# Patient Record
Sex: MALE | Race: WHITE | Employment: FULL TIME | URBAN - METROPOLITAN AREA
[De-identification: names, ages, dates, MRNs, and addresses within clinical notes are randomized per-mention and may not be internally consistent; named-entity substitution may affect disease eponyms.]

---

## 2017-01-02 ENCOUNTER — PATIENT MESSAGE (OUTPATIENT)
Dept: FAMILY MEDICINE CLINIC | Age: 42
End: 2017-01-02

## 2017-01-02 DIAGNOSIS — J01.91 ACUTE RECURRENT SINUSITIS, UNSPECIFIED LOCATION: Primary | ICD-10-CM

## 2017-01-02 PROCEDURE — 99444 PR PHYSICIAN ONLINE EVALUATION & MANAGEMENT SERVICE: CPT | Performed by: FAMILY MEDICINE

## 2017-01-02 RX ORDER — AZITHROMYCIN 250 MG/1
TABLET, FILM COATED ORAL
Qty: 1 PACKET | Refills: 0 | Status: SHIPPED | OUTPATIENT
Start: 2017-01-02 | End: 2017-01-12

## 2017-01-17 ENCOUNTER — OFFICE VISIT (OUTPATIENT)
Dept: PSYCHOLOGY | Age: 42
End: 2017-01-17

## 2017-01-17 DIAGNOSIS — F39 MOOD DISORDER (HCC): Primary | ICD-10-CM

## 2017-01-17 DIAGNOSIS — F41.1 GENERALIZED ANXIETY DISORDER: ICD-10-CM

## 2017-01-17 PROCEDURE — 90832 PSYTX W PT 30 MINUTES: CPT | Performed by: PSYCHOLOGIST

## 2017-01-17 ASSESSMENT — PATIENT HEALTH QUESTIONNAIRE - PHQ9
1. LITTLE INTEREST OR PLEASURE IN DOING THINGS: 0
3. TROUBLE FALLING OR STAYING ASLEEP: 1
7. TROUBLE CONCENTRATING ON THINGS, SUCH AS READING THE NEWSPAPER OR WATCHING TELEVISION: 0
SUM OF ALL RESPONSES TO PHQ QUESTIONS 1-9: 4
5. POOR APPETITE OR OVEREATING: 1
8. MOVING OR SPEAKING SO SLOWLY THAT OTHER PEOPLE COULD HAVE NOTICED. OR THE OPPOSITE, BEING SO FIGETY OR RESTLESS THAT YOU HAVE BEEN MOVING AROUND A LOT MORE THAN USUAL: 0
9. THOUGHTS THAT YOU WOULD BE BETTER OFF DEAD, OR OF HURTING YOURSELF: 0
10. IF YOU CHECKED OFF ANY PROBLEMS, HOW DIFFICULT HAVE THESE PROBLEMS MADE IT FOR YOU TO DO YOUR WORK, TAKE CARE OF THINGS AT HOME, OR GET ALONG WITH OTHER PEOPLE: 0
2. FEELING DOWN, DEPRESSED OR HOPELESS: 1
6. FEELING BAD ABOUT YOURSELF - OR THAT YOU ARE A FAILURE OR HAVE LET YOURSELF OR YOUR FAMILY DOWN: 1
SUM OF ALL RESPONSES TO PHQ9 QUESTIONS 1 & 2: 1
4. FEELING TIRED OR HAVING LITTLE ENERGY: 0

## 2017-01-31 ENCOUNTER — OFFICE VISIT (OUTPATIENT)
Dept: PSYCHOLOGY | Age: 42
End: 2017-01-31

## 2017-01-31 DIAGNOSIS — F41.1 GENERALIZED ANXIETY DISORDER: ICD-10-CM

## 2017-01-31 DIAGNOSIS — F39 MOOD DISORDER (HCC): Primary | ICD-10-CM

## 2017-01-31 PROCEDURE — 90832 PSYTX W PT 30 MINUTES: CPT | Performed by: PSYCHOLOGIST

## 2017-01-31 ASSESSMENT — PATIENT HEALTH QUESTIONNAIRE - PHQ9
4. FEELING TIRED OR HAVING LITTLE ENERGY: 1
8. MOVING OR SPEAKING SO SLOWLY THAT OTHER PEOPLE COULD HAVE NOTICED. OR THE OPPOSITE, BEING SO FIGETY OR RESTLESS THAT YOU HAVE BEEN MOVING AROUND A LOT MORE THAN USUAL: 0
7. TROUBLE CONCENTRATING ON THINGS, SUCH AS READING THE NEWSPAPER OR WATCHING TELEVISION: 1
1. LITTLE INTEREST OR PLEASURE IN DOING THINGS: 0
6. FEELING BAD ABOUT YOURSELF - OR THAT YOU ARE A FAILURE OR HAVE LET YOURSELF OR YOUR FAMILY DOWN: 0
9. THOUGHTS THAT YOU WOULD BE BETTER OFF DEAD, OR OF HURTING YOURSELF: 0
SUM OF ALL RESPONSES TO PHQ9 QUESTIONS 1 & 2: 0
SUM OF ALL RESPONSES TO PHQ QUESTIONS 1-9: 3
3. TROUBLE FALLING OR STAYING ASLEEP: 1
10. IF YOU CHECKED OFF ANY PROBLEMS, HOW DIFFICULT HAVE THESE PROBLEMS MADE IT FOR YOU TO DO YOUR WORK, TAKE CARE OF THINGS AT HOME, OR GET ALONG WITH OTHER PEOPLE: 0
2. FEELING DOWN, DEPRESSED OR HOPELESS: 0
5. POOR APPETITE OR OVEREATING: 0

## 2017-02-10 ENCOUNTER — OFFICE VISIT (OUTPATIENT)
Dept: FAMILY MEDICINE CLINIC | Age: 42
End: 2017-02-10

## 2017-02-10 VITALS
HEIGHT: 72 IN | WEIGHT: 198.2 LBS | SYSTOLIC BLOOD PRESSURE: 110 MMHG | BODY MASS INDEX: 26.84 KG/M2 | TEMPERATURE: 98.5 F | HEART RATE: 52 BPM | OXYGEN SATURATION: 98 % | DIASTOLIC BLOOD PRESSURE: 80 MMHG

## 2017-02-10 DIAGNOSIS — F41.9 ANXIETY: ICD-10-CM

## 2017-02-10 DIAGNOSIS — E78.2 MIXED HYPERLIPIDEMIA: ICD-10-CM

## 2017-02-10 DIAGNOSIS — H34.8312 BRANCH RETINAL VEIN OCCLUSION OF RIGHT EYE: ICD-10-CM

## 2017-02-10 DIAGNOSIS — D68.59 HYPERCOAGULABLE STATE (HCC): Primary | ICD-10-CM

## 2017-02-10 PROCEDURE — 99214 OFFICE O/P EST MOD 30 MIN: CPT | Performed by: FAMILY MEDICINE

## 2017-02-10 RX ORDER — ASPIRIN 81 MG/1
81 TABLET, CHEWABLE ORAL
COMMUNITY
Start: 2017-01-20 | End: 2017-02-28

## 2017-02-10 RX ORDER — LAMOTRIGINE 25 MG/1
25 TABLET ORAL 2 TIMES DAILY
COMMUNITY
End: 2017-07-18

## 2017-02-10 RX ORDER — ATORVASTATIN CALCIUM 80 MG/1
80 TABLET, FILM COATED ORAL DAILY
Qty: 30 TABLET | Refills: 0
Start: 2017-02-10 | End: 2017-02-28 | Stop reason: SDUPTHER

## 2017-02-12 ASSESSMENT — ENCOUNTER SYMPTOMS
RESPIRATORY NEGATIVE: 1
GASTROINTESTINAL NEGATIVE: 1
EYES NEGATIVE: 1

## 2017-02-15 DIAGNOSIS — E78.2 MIXED HYPERLIPIDEMIA: ICD-10-CM

## 2017-02-15 LAB
CHOLESTEROL, TOTAL: 189 MG/DL (ref 0–199)
HDLC SERPL-MCNC: 60 MG/DL (ref 40–60)
LDL CHOLESTEROL CALCULATED: 109 MG/DL
TRIGL SERPL-MCNC: 102 MG/DL (ref 0–150)
VLDLC SERPL CALC-MCNC: 20 MG/DL

## 2017-02-21 ENCOUNTER — OFFICE VISIT (OUTPATIENT)
Dept: PSYCHOLOGY | Age: 42
End: 2017-02-21

## 2017-02-21 DIAGNOSIS — F41.1 GENERALIZED ANXIETY DISORDER: ICD-10-CM

## 2017-02-21 DIAGNOSIS — F39 MOOD DISORDER (HCC): Primary | ICD-10-CM

## 2017-02-21 PROCEDURE — 90832 PSYTX W PT 30 MINUTES: CPT | Performed by: PSYCHOLOGIST

## 2017-02-21 ASSESSMENT — PATIENT HEALTH QUESTIONNAIRE - PHQ9
3. TROUBLE FALLING OR STAYING ASLEEP: 0
5. POOR APPETITE OR OVEREATING: 0
8. MOVING OR SPEAKING SO SLOWLY THAT OTHER PEOPLE COULD HAVE NOTICED. OR THE OPPOSITE, BEING SO FIGETY OR RESTLESS THAT YOU HAVE BEEN MOVING AROUND A LOT MORE THAN USUAL: 0
6. FEELING BAD ABOUT YOURSELF - OR THAT YOU ARE A FAILURE OR HAVE LET YOURSELF OR YOUR FAMILY DOWN: 0
1. LITTLE INTEREST OR PLEASURE IN DOING THINGS: 0
2. FEELING DOWN, DEPRESSED OR HOPELESS: 0
4. FEELING TIRED OR HAVING LITTLE ENERGY: 0
SUM OF ALL RESPONSES TO PHQ9 QUESTIONS 1 & 2: 0
9. THOUGHTS THAT YOU WOULD BE BETTER OFF DEAD, OR OF HURTING YOURSELF: 0
SUM OF ALL RESPONSES TO PHQ QUESTIONS 1-9: 0
7. TROUBLE CONCENTRATING ON THINGS, SUCH AS READING THE NEWSPAPER OR WATCHING TELEVISION: 0

## 2017-02-28 DIAGNOSIS — E78.2 MIXED HYPERLIPIDEMIA: ICD-10-CM

## 2017-02-28 RX ORDER — ATORVASTATIN CALCIUM 80 MG/1
80 TABLET, FILM COATED ORAL DAILY
Qty: 90 TABLET | Refills: 1 | Status: SHIPPED | OUTPATIENT
Start: 2017-02-28 | End: 2017-10-31 | Stop reason: SDUPTHER

## 2017-03-28 ENCOUNTER — PATIENT MESSAGE (OUTPATIENT)
Dept: FAMILY MEDICINE CLINIC | Age: 42
End: 2017-03-28

## 2017-03-28 RX ORDER — LORAZEPAM 0.5 MG/1
0.5 TABLET ORAL EVERY 6 HOURS PRN
Qty: 12 TABLET | Refills: 0 | Status: SHIPPED | OUTPATIENT
Start: 2017-03-28 | End: 2017-09-11 | Stop reason: ALTCHOICE

## 2017-05-12 ENCOUNTER — TELEPHONE (OUTPATIENT)
Dept: FAMILY MEDICINE CLINIC | Age: 42
End: 2017-05-12

## 2017-06-06 ENCOUNTER — TELEPHONE (OUTPATIENT)
Dept: PSYCHOLOGY | Age: 42
End: 2017-06-06

## 2017-06-06 ENCOUNTER — OFFICE VISIT (OUTPATIENT)
Dept: PSYCHOLOGY | Age: 42
End: 2017-06-06

## 2017-06-06 DIAGNOSIS — F39 MOOD DISORDER (HCC): Primary | ICD-10-CM

## 2017-06-06 DIAGNOSIS — F41.1 GENERALIZED ANXIETY DISORDER: ICD-10-CM

## 2017-06-06 PROCEDURE — 90832 PSYTX W PT 30 MINUTES: CPT | Performed by: PSYCHOLOGIST

## 2017-06-06 ASSESSMENT — ANXIETY QUESTIONNAIRES
4. TROUBLE RELAXING: 3-NEARLY EVERY DAY
1. FEELING NERVOUS, ANXIOUS, OR ON EDGE: 1-SEVERAL DAYS
5. BEING SO RESTLESS THAT IT IS HARD TO SIT STILL: 2-OVER HALF THE DAYS
GAD7 TOTAL SCORE: 11
7. FEELING AFRAID AS IF SOMETHING AWFUL MIGHT HAPPEN: 0-NOT AT ALL SURE
2. NOT BEING ABLE TO STOP OR CONTROL WORRYING: 1-SEVERAL DAYS
6. BECOMING EASILY ANNOYED OR IRRITABLE: 2-OVER HALF THE DAYS
3. WORRYING TOO MUCH ABOUT DIFFERENT THINGS: 2-OVER HALF THE DAYS

## 2017-06-06 ASSESSMENT — PATIENT HEALTH QUESTIONNAIRE - PHQ9
2. FEELING DOWN, DEPRESSED OR HOPELESS: 2
SUM OF ALL RESPONSES TO PHQ QUESTIONS 1-9: 9
6. FEELING BAD ABOUT YOURSELF - OR THAT YOU ARE A FAILURE OR HAVE LET YOURSELF OR YOUR FAMILY DOWN: 2
5. POOR APPETITE OR OVEREATING: 0
3. TROUBLE FALLING OR STAYING ASLEEP: 2
1. LITTLE INTEREST OR PLEASURE IN DOING THINGS: 1
SUM OF ALL RESPONSES TO PHQ9 QUESTIONS 1 & 2: 3
8. MOVING OR SPEAKING SO SLOWLY THAT OTHER PEOPLE COULD HAVE NOTICED. OR THE OPPOSITE, BEING SO FIGETY OR RESTLESS THAT YOU HAVE BEEN MOVING AROUND A LOT MORE THAN USUAL: 0
4. FEELING TIRED OR HAVING LITTLE ENERGY: 1
7. TROUBLE CONCENTRATING ON THINGS, SUCH AS READING THE NEWSPAPER OR WATCHING TELEVISION: 0
9. THOUGHTS THAT YOU WOULD BE BETTER OFF DEAD, OR OF HURTING YOURSELF: 1

## 2017-06-06 NOTE — TELEPHONE ENCOUNTER
Pt denied benefit from Lamictal at any time and expressed interest in stopping this medication. Reports that he has never received benefit from any psychotropic medications he has tried. Elevated distress lately due to psychosocial stressors. How would you like to proceed?

## 2017-06-20 ENCOUNTER — OFFICE VISIT (OUTPATIENT)
Dept: PSYCHOLOGY | Age: 42
End: 2017-06-20

## 2017-06-20 DIAGNOSIS — F39 MOOD DISORDER (HCC): Primary | ICD-10-CM

## 2017-06-20 DIAGNOSIS — F41.1 GENERALIZED ANXIETY DISORDER: ICD-10-CM

## 2017-06-20 PROCEDURE — 90832 PSYTX W PT 30 MINUTES: CPT | Performed by: PSYCHOLOGIST

## 2017-06-20 ASSESSMENT — PATIENT HEALTH QUESTIONNAIRE - PHQ9
9. THOUGHTS THAT YOU WOULD BE BETTER OFF DEAD, OR OF HURTING YOURSELF: 0
3. TROUBLE FALLING OR STAYING ASLEEP: 0
2. FEELING DOWN, DEPRESSED OR HOPELESS: 1
6. FEELING BAD ABOUT YOURSELF - OR THAT YOU ARE A FAILURE OR HAVE LET YOURSELF OR YOUR FAMILY DOWN: 1
5. POOR APPETITE OR OVEREATING: 0
8. MOVING OR SPEAKING SO SLOWLY THAT OTHER PEOPLE COULD HAVE NOTICED. OR THE OPPOSITE, BEING SO FIGETY OR RESTLESS THAT YOU HAVE BEEN MOVING AROUND A LOT MORE THAN USUAL: 0
4. FEELING TIRED OR HAVING LITTLE ENERGY: 2
SUM OF ALL RESPONSES TO PHQ QUESTIONS 1-9: 6
SUM OF ALL RESPONSES TO PHQ9 QUESTIONS 1 & 2: 2
1. LITTLE INTEREST OR PLEASURE IN DOING THINGS: 1
7. TROUBLE CONCENTRATING ON THINGS, SUCH AS READING THE NEWSPAPER OR WATCHING TELEVISION: 1

## 2017-06-20 ASSESSMENT — ANXIETY QUESTIONNAIRES
7. FEELING AFRAID AS IF SOMETHING AWFUL MIGHT HAPPEN: 1-SEVERAL DAYS
2. NOT BEING ABLE TO STOP OR CONTROL WORRYING: 1-SEVERAL DAYS
3. WORRYING TOO MUCH ABOUT DIFFERENT THINGS: 1-SEVERAL DAYS
5. BEING SO RESTLESS THAT IT IS HARD TO SIT STILL: 0-NOT AT ALL SURE
GAD7 TOTAL SCORE: 8
1. FEELING NERVOUS, ANXIOUS, OR ON EDGE: 2-OVER HALF THE DAYS
6. BECOMING EASILY ANNOYED OR IRRITABLE: 1-SEVERAL DAYS
4. TROUBLE RELAXING: 2-OVER HALF THE DAYS

## 2017-06-21 ENCOUNTER — TELEPHONE (OUTPATIENT)
Dept: FAMILY MEDICINE CLINIC | Age: 42
End: 2017-06-21

## 2017-07-07 ENCOUNTER — OFFICE VISIT (OUTPATIENT)
Dept: PSYCHOLOGY | Age: 42
End: 2017-07-07

## 2017-07-07 DIAGNOSIS — F39 MOOD DISORDER (HCC): Primary | ICD-10-CM

## 2017-07-07 DIAGNOSIS — F41.1 GENERALIZED ANXIETY DISORDER: ICD-10-CM

## 2017-07-07 PROCEDURE — 90832 PSYTX W PT 30 MINUTES: CPT | Performed by: PSYCHOLOGIST

## 2017-07-07 ASSESSMENT — ANXIETY QUESTIONNAIRES
4. TROUBLE RELAXING: 3-NEARLY EVERY DAY
6. BECOMING EASILY ANNOYED OR IRRITABLE: 2-OVER HALF THE DAYS
7. FEELING AFRAID AS IF SOMETHING AWFUL MIGHT HAPPEN: 1-SEVERAL DAYS
1. FEELING NERVOUS, ANXIOUS, OR ON EDGE: 2-OVER HALF THE DAYS
2. NOT BEING ABLE TO STOP OR CONTROL WORRYING: 3-NEARLY EVERY DAY
5. BEING SO RESTLESS THAT IT IS HARD TO SIT STILL: 1-SEVERAL DAYS
GAD7 TOTAL SCORE: 15
3. WORRYING TOO MUCH ABOUT DIFFERENT THINGS: 3-NEARLY EVERY DAY

## 2017-07-07 ASSESSMENT — PATIENT HEALTH QUESTIONNAIRE - PHQ9
3. TROUBLE FALLING OR STAYING ASLEEP: 1
4. FEELING TIRED OR HAVING LITTLE ENERGY: 1
SUM OF ALL RESPONSES TO PHQ9 QUESTIONS 1 & 2: 3
9. THOUGHTS THAT YOU WOULD BE BETTER OFF DEAD, OR OF HURTING YOURSELF: 1
6. FEELING BAD ABOUT YOURSELF - OR THAT YOU ARE A FAILURE OR HAVE LET YOURSELF OR YOUR FAMILY DOWN: 3
2. FEELING DOWN, DEPRESSED OR HOPELESS: 1
7. TROUBLE CONCENTRATING ON THINGS, SUCH AS READING THE NEWSPAPER OR WATCHING TELEVISION: 1
1. LITTLE INTEREST OR PLEASURE IN DOING THINGS: 2
SUM OF ALL RESPONSES TO PHQ QUESTIONS 1-9: 11
5. POOR APPETITE OR OVEREATING: 1
8. MOVING OR SPEAKING SO SLOWLY THAT OTHER PEOPLE COULD HAVE NOTICED. OR THE OPPOSITE, BEING SO FIGETY OR RESTLESS THAT YOU HAVE BEEN MOVING AROUND A LOT MORE THAN USUAL: 0

## 2017-07-13 ENCOUNTER — PATIENT MESSAGE (OUTPATIENT)
Dept: FAMILY MEDICINE CLINIC | Age: 42
End: 2017-07-13

## 2017-07-18 ENCOUNTER — OFFICE VISIT (OUTPATIENT)
Dept: PSYCHIATRY | Age: 42
End: 2017-07-18

## 2017-07-18 VITALS
BODY MASS INDEX: 27.22 KG/M2 | WEIGHT: 201 LBS | SYSTOLIC BLOOD PRESSURE: 130 MMHG | HEIGHT: 72 IN | HEART RATE: 75 BPM | DIASTOLIC BLOOD PRESSURE: 90 MMHG | OXYGEN SATURATION: 98 %

## 2017-07-18 DIAGNOSIS — F41.1 GAD (GENERALIZED ANXIETY DISORDER): ICD-10-CM

## 2017-07-18 DIAGNOSIS — F31.60 BIPOLAR AFFECTIVE DISORDER, MIXED (HCC): Primary | ICD-10-CM

## 2017-07-18 PROCEDURE — 99215 OFFICE O/P EST HI 40 MIN: CPT | Performed by: PSYCHIATRY & NEUROLOGY

## 2017-07-18 RX ORDER — GABAPENTIN 300 MG/1
300 CAPSULE ORAL NIGHTLY
Qty: 90 CAPSULE | Refills: 2 | Status: SHIPPED | OUTPATIENT
Start: 2017-07-18 | End: 2017-09-11 | Stop reason: SDUPTHER

## 2017-07-18 RX ORDER — LAMOTRIGINE 25 MG/1
25 TABLET ORAL DAILY
Qty: 60 TABLET | Refills: 2 | Status: SHIPPED | OUTPATIENT
Start: 2017-07-18 | End: 2017-09-11

## 2017-07-18 RX ORDER — GABAPENTIN 100 MG/1
100 CAPSULE ORAL 3 TIMES DAILY
Qty: 90 CAPSULE | Refills: 2 | Status: SHIPPED | OUTPATIENT
Start: 2017-07-18 | End: 2017-09-11

## 2017-07-18 ASSESSMENT — ENCOUNTER SYMPTOMS
RESPIRATORY NEGATIVE: 1
GASTROINTESTINAL NEGATIVE: 1
BACK PAIN: 1
EYES NEGATIVE: 1

## 2017-08-01 ENCOUNTER — TELEPHONE (OUTPATIENT)
Dept: FAMILY MEDICINE CLINIC | Age: 42
End: 2017-08-01

## 2017-08-01 ENCOUNTER — OFFICE VISIT (OUTPATIENT)
Dept: FAMILY MEDICINE CLINIC | Age: 42
End: 2017-08-01

## 2017-08-01 ENCOUNTER — OFFICE VISIT (OUTPATIENT)
Dept: PSYCHOLOGY | Age: 42
End: 2017-08-01

## 2017-08-01 VITALS
HEART RATE: 100 BPM | WEIGHT: 202.1 LBS | DIASTOLIC BLOOD PRESSURE: 86 MMHG | SYSTOLIC BLOOD PRESSURE: 118 MMHG | TEMPERATURE: 98.1 F | OXYGEN SATURATION: 100 % | BODY MASS INDEX: 27.37 KG/M2 | HEIGHT: 72 IN

## 2017-08-01 DIAGNOSIS — F31.60 BIPOLAR AFFECTIVE DISORDER, MIXED (HCC): ICD-10-CM

## 2017-08-01 DIAGNOSIS — M47.26 OSTEOARTHRITIS OF SPINE WITH RADICULOPATHY, LUMBAR REGION: ICD-10-CM

## 2017-08-01 DIAGNOSIS — F32.0 MILD SINGLE CURRENT EPISODE OF MAJOR DEPRESSIVE DISORDER (HCC): ICD-10-CM

## 2017-08-01 DIAGNOSIS — F41.9 ANXIETY: ICD-10-CM

## 2017-08-01 DIAGNOSIS — M51.26 LUMBAR DISC HERNIATION: ICD-10-CM

## 2017-08-01 DIAGNOSIS — F41.1 GENERALIZED ANXIETY DISORDER: ICD-10-CM

## 2017-08-01 DIAGNOSIS — Z01.818 PREOP EXAMINATION: Primary | ICD-10-CM

## 2017-08-01 DIAGNOSIS — E78.2 MIXED HYPERLIPIDEMIA: ICD-10-CM

## 2017-08-01 PROCEDURE — 99244 OFF/OP CNSLTJ NEW/EST MOD 40: CPT | Performed by: NURSE PRACTITIONER

## 2017-08-01 PROCEDURE — 90832 PSYTX W PT 30 MINUTES: CPT | Performed by: PSYCHOLOGIST

## 2017-08-01 ASSESSMENT — PATIENT HEALTH QUESTIONNAIRE - PHQ9
3. TROUBLE FALLING OR STAYING ASLEEP: 0
4. FEELING TIRED OR HAVING LITTLE ENERGY: 1
5. POOR APPETITE OR OVEREATING: 1
6. FEELING BAD ABOUT YOURSELF - OR THAT YOU ARE A FAILURE OR HAVE LET YOURSELF OR YOUR FAMILY DOWN: 1
1. LITTLE INTEREST OR PLEASURE IN DOING THINGS: 1
8. MOVING OR SPEAKING SO SLOWLY THAT OTHER PEOPLE COULD HAVE NOTICED. OR THE OPPOSITE, BEING SO FIGETY OR RESTLESS THAT YOU HAVE BEEN MOVING AROUND A LOT MORE THAN USUAL: 1
SUM OF ALL RESPONSES TO PHQ QUESTIONS 1-9: 7
2. FEELING DOWN, DEPRESSED OR HOPELESS: 1
9. THOUGHTS THAT YOU WOULD BE BETTER OFF DEAD, OR OF HURTING YOURSELF: 0
SUM OF ALL RESPONSES TO PHQ9 QUESTIONS 1 & 2: 2
7. TROUBLE CONCENTRATING ON THINGS, SUCH AS READING THE NEWSPAPER OR WATCHING TELEVISION: 1

## 2017-08-01 ASSESSMENT — ANXIETY QUESTIONNAIRES
5. BEING SO RESTLESS THAT IT IS HARD TO SIT STILL: 2-OVER HALF THE DAYS
1. FEELING NERVOUS, ANXIOUS, OR ON EDGE: 2-OVER HALF THE DAYS
GAD7 TOTAL SCORE: 13
4. TROUBLE RELAXING: 3-NEARLY EVERY DAY
2. NOT BEING ABLE TO STOP OR CONTROL WORRYING: 1-SEVERAL DAYS
6. BECOMING EASILY ANNOYED OR IRRITABLE: 2-OVER HALF THE DAYS
3. WORRYING TOO MUCH ABOUT DIFFERENT THINGS: 2-OVER HALF THE DAYS
7. FEELING AFRAID AS IF SOMETHING AWFUL MIGHT HAPPEN: 1-SEVERAL DAYS

## 2017-08-15 ENCOUNTER — TELEPHONE (OUTPATIENT)
Dept: FAMILY MEDICINE CLINIC | Age: 42
End: 2017-08-15

## 2017-09-11 ENCOUNTER — OFFICE VISIT (OUTPATIENT)
Dept: PSYCHIATRY | Age: 42
End: 2017-09-11

## 2017-09-11 VITALS
HEART RATE: 86 BPM | BODY MASS INDEX: 27.83 KG/M2 | HEIGHT: 73 IN | OXYGEN SATURATION: 98 % | DIASTOLIC BLOOD PRESSURE: 70 MMHG | WEIGHT: 210 LBS | SYSTOLIC BLOOD PRESSURE: 110 MMHG

## 2017-09-11 DIAGNOSIS — F31.60 BIPOLAR AFFECTIVE DISORDER, MIXED (HCC): ICD-10-CM

## 2017-09-11 DIAGNOSIS — F41.1 GAD (GENERALIZED ANXIETY DISORDER): Primary | ICD-10-CM

## 2017-09-11 PROCEDURE — 99214 OFFICE O/P EST MOD 30 MIN: CPT | Performed by: PSYCHIATRY & NEUROLOGY

## 2017-09-11 RX ORDER — LAMOTRIGINE 100 MG/1
100 TABLET ORAL NIGHTLY
Qty: 60 TABLET | Refills: 3 | Status: SHIPPED | OUTPATIENT
Start: 2017-09-11 | End: 2017-10-04 | Stop reason: SDUPTHER

## 2017-09-11 RX ORDER — GABAPENTIN 300 MG/1
300 CAPSULE ORAL 3 TIMES DAILY
Qty: 90 CAPSULE | Refills: 2 | Status: SHIPPED | OUTPATIENT
Start: 2017-09-11 | End: 2017-11-13 | Stop reason: SDUPTHER

## 2017-09-11 ASSESSMENT — ENCOUNTER SYMPTOMS
GASTROINTESTINAL NEGATIVE: 1
EYES NEGATIVE: 1
RESPIRATORY NEGATIVE: 1
BACK PAIN: 1

## 2017-09-29 DIAGNOSIS — E78.2 MIXED HYPERLIPIDEMIA: ICD-10-CM

## 2017-09-29 RX ORDER — FENOFIBRATE 145 MG/1
TABLET, COATED ORAL
Qty: 21 TABLET | Refills: 0 | Status: SHIPPED | OUTPATIENT
Start: 2017-09-29 | End: 2017-10-31 | Stop reason: SDUPTHER

## 2017-10-03 ENCOUNTER — TELEPHONE (OUTPATIENT)
Dept: PSYCHIATRY | Age: 42
End: 2017-10-03

## 2017-10-03 NOTE — TELEPHONE ENCOUNTER
Unsure if increased to 150mg already. If recently increased should continue that dose. (He was to take 100mg for 2 weeks then increase to 150mg) Could we find out. The medication cannot be increased quickly due to concern of rash.

## 2017-10-04 ENCOUNTER — OFFICE VISIT (OUTPATIENT)
Dept: FAMILY MEDICINE CLINIC | Age: 42
End: 2017-10-04

## 2017-10-04 VITALS
SYSTOLIC BLOOD PRESSURE: 120 MMHG | BODY MASS INDEX: 28.1 KG/M2 | WEIGHT: 213 LBS | DIASTOLIC BLOOD PRESSURE: 86 MMHG | TEMPERATURE: 98.2 F

## 2017-10-04 DIAGNOSIS — E55.9 VITAMIN D DEFICIENCY: ICD-10-CM

## 2017-10-04 DIAGNOSIS — F31.60 BIPOLAR AFFECTIVE DISORDER, MIXED (HCC): Primary | ICD-10-CM

## 2017-10-04 DIAGNOSIS — G89.4 CHRONIC PAIN SYNDROME: ICD-10-CM

## 2017-10-04 DIAGNOSIS — F31.60 BIPOLAR AFFECTIVE DISORDER, MIXED (HCC): ICD-10-CM

## 2017-10-04 DIAGNOSIS — F32.1 MODERATE SINGLE CURRENT EPISODE OF MAJOR DEPRESSIVE DISORDER (HCC): ICD-10-CM

## 2017-10-04 DIAGNOSIS — F41.1 GENERALIZED ANXIETY DISORDER: ICD-10-CM

## 2017-10-04 LAB
A/G RATIO: 1.8 (ref 1.1–2.2)
ALBUMIN SERPL-MCNC: 4.8 G/DL (ref 3.4–5)
ALP BLD-CCNC: 84 U/L (ref 40–129)
ALT SERPL-CCNC: 55 U/L (ref 10–40)
ANION GAP SERPL CALCULATED.3IONS-SCNC: 17 MMOL/L (ref 3–16)
AST SERPL-CCNC: 103 U/L (ref 15–37)
BASOPHILS ABSOLUTE: 0.1 K/UL (ref 0–0.2)
BASOPHILS RELATIVE PERCENT: 1 %
BILIRUB SERPL-MCNC: <0.2 MG/DL (ref 0–1)
BUN BLDV-MCNC: 13 MG/DL (ref 7–20)
CALCIUM SERPL-MCNC: 10.2 MG/DL (ref 8.3–10.6)
CHLORIDE BLD-SCNC: 100 MMOL/L (ref 99–110)
CO2: 24 MMOL/L (ref 21–32)
CREAT SERPL-MCNC: 0.7 MG/DL (ref 0.9–1.3)
EOSINOPHILS ABSOLUTE: 0.3 K/UL (ref 0–0.6)
EOSINOPHILS RELATIVE PERCENT: 3.1 %
GFR AFRICAN AMERICAN: >60
GFR NON-AFRICAN AMERICAN: >60
GLOBULIN: 2.6 G/DL
GLUCOSE BLD-MCNC: 94 MG/DL (ref 70–99)
HCT VFR BLD CALC: 41.1 % (ref 40.5–52.5)
HEMOGLOBIN: 14.1 G/DL (ref 13.5–17.5)
LYMPHOCYTES ABSOLUTE: 2.5 K/UL (ref 1–5.1)
LYMPHOCYTES RELATIVE PERCENT: 30 %
MCH RBC QN AUTO: 31.9 PG (ref 26–34)
MCHC RBC AUTO-ENTMCNC: 34.4 G/DL (ref 31–36)
MCV RBC AUTO: 92.7 FL (ref 80–100)
MONOCYTES ABSOLUTE: 0.7 K/UL (ref 0–1.3)
MONOCYTES RELATIVE PERCENT: 8.2 %
NEUTROPHILS ABSOLUTE: 4.7 K/UL (ref 1.7–7.7)
NEUTROPHILS RELATIVE PERCENT: 57.7 %
PDW BLD-RTO: 13.3 % (ref 12.4–15.4)
PLATELET # BLD: 355 K/UL (ref 135–450)
PMV BLD AUTO: 9 FL (ref 5–10.5)
POTASSIUM SERPL-SCNC: 4.6 MMOL/L (ref 3.5–5.1)
RBC # BLD: 4.43 M/UL (ref 4.2–5.9)
SODIUM BLD-SCNC: 141 MMOL/L (ref 136–145)
TOTAL PROTEIN: 7.4 G/DL (ref 6.4–8.2)
TSH SERPL DL<=0.05 MIU/L-ACNC: 1.55 UIU/ML (ref 0.27–4.2)
VITAMIN D 25-HYDROXY: 29.3 NG/ML
WBC # BLD: 8.2 K/UL (ref 4–11)

## 2017-10-04 PROCEDURE — 99214 OFFICE O/P EST MOD 30 MIN: CPT | Performed by: FAMILY MEDICINE

## 2017-10-04 RX ORDER — CLONIDINE HYDROCHLORIDE 0.1 MG/1
0.1 TABLET ORAL 2 TIMES DAILY
Qty: 60 TABLET | Refills: 3 | Status: SHIPPED | OUTPATIENT
Start: 2017-10-04 | End: 2017-10-10

## 2017-10-04 RX ORDER — LAMOTRIGINE 100 MG/1
200 TABLET ORAL NIGHTLY
Qty: 60 TABLET | Refills: 3 | Status: SHIPPED | OUTPATIENT
Start: 2017-10-04 | End: 2017-10-16

## 2017-10-04 ASSESSMENT — ENCOUNTER SYMPTOMS
EYES NEGATIVE: 1
GASTROINTESTINAL NEGATIVE: 1
RESPIRATORY NEGATIVE: 1

## 2017-10-04 NOTE — TELEPHONE ENCOUNTER
Spoke to pt and advised of the dosage change. Pt was upset and frustrated of the delayed response from office. He stated that he is going to take the Lamictal and talk to Dr. Elodia Mosher about it and see if there is another psychiatrist he can go to. Stated that he is just having a hard time and it was frustrating to him that he was unable to get ahold of office in his time of need.

## 2017-10-04 NOTE — MR AVS SNAPSHOT
After Visit Summary             Tricia Leslie   10/4/2017 11:45 AM   Office Visit    Description:  Male : 1975   Provider:  Mike Villa MD   Department:  [address] [facility] [address]              Your Follow-Up and Future Appointments         Below is a list of your follow-up and future appointments. This may not be a complete list as you may have made appointments directly with providers that we are not aware of or your providers may have made some for you. Please call your providers to confirm appointments. It is important to keep your appointments. Please bring your current insurance card, photo ID, co-pay, and all medication bottles to your appointment. If self-pay, payment is expected at the time of service. Your To-Do List     Future Orders Complete By Expires    CBC Auto Differential [FWZ4343 Custom]  10/4/2017 10/4/2018    Comprehensive Metabolic Panel [PLD08 Custom]  10/4/2017 10/4/2018    TSH without Reflex [QRV595 Custom]  10/4/2017 10/4/2018    Vitamin D 25 Hydroxy [WZW350 Custom]  10/4/2017 10/4/2018         Information from Your Visit        Department     Name Address Phone Fax    [address] [facility] [address] [facility] [place] [address] President Amilcar [facility] [phone]      You Were Seen for:         Comments    Chronic pain syndrome   [338. 4. ICD-9-CM]         Vital Signs     Blood Pressure Temperature Weight Body Mass Index Smoking Status       120/86 98.2 °F (36.8 °C) (Temporal) 213 lb (96.6 kg) 28.1 kg/m2 Never Smoker       Additional Information about your Body Mass Index (BMI)           Your BMI as listed above is considered overweight (25.0-29.9). BMI is an estimate of body fat, calculated from your height and weight. The higher your BMI, the greater your risk of heart disease, high blood pressure, type 2 diabetes, stroke, gallstones, arthritis, sleep apnea, and certain cancers. BMI is not perfect.   It may overestimate body fat in athletes and Problem List as of 10/4/2017  Date Reviewed: 8/1/2017                Bipolar affective disorder, mixed ([facility] [address]    Generalized anxiety disorder    Mixed hyperlipidemia    Chronic pain syndrome    Vitamin D deficiency    Depression    Arthritis, multiple joint involvement    Testosterone deficiency    Anxiety      Your Goals as of 10/4/2017 at 1:15 PM                 Weight    Weight < 200 lb (90.719 kg)     Notes    The patient has a goal of losing five pounds in the next three months. He/she feels they have an 8/10 chance of doing this. They will do this through lifestyle changes including less caloric intake and increased exercise. Immunizations as of 10/4/2017     Name Date    Influenza Virus Vaccine 10/12/2012    Tdap (Boostrix, Adacel) 12/2/2014      Preventive Care        Date Due    Yearly Flu Vaccine (1) 9/1/2017    Cholesterol Screening 2/15/2022    Tetanus Combination Vaccine (2 - Td) 12/2/2024            [employer] Signup           Our records indicate that you have an active [employer] account. You can view your After Visit Summary by going to [email or web address] and logging in with your [employer] username and password. If you don't have a [employer] username and password but a parent or guardian has access to your record, the parent or guardian should login with their own [employer] username and password and access your record to view the After Visit Summary. Additional Information  If you have questions, please contact the physician practice where you receive care. Remember, [employer] is NOT to be used for urgent needs. For medical emergencies, dial 911. For questions regarding your [employer] account call [phone]. If you have a clinical question, please call your doctor's office.

## 2017-10-04 NOTE — PROGRESS NOTES
Negative. Genitourinary: Negative. Musculoskeletal: Negative. Psychiatric/Behavioral: Negative. Physical Exam   Constitutional: He appears well-developed and well-nourished. No distress. Neck: Normal range of motion. Neck supple. Normal carotid pulses, no hepatojugular reflux and no JVD present. Carotid bruit is not present. No tracheal deviation present. No thyromegaly present. Cardiovascular: Normal rate, regular rhythm, S2 normal, normal heart sounds and intact distal pulses. PMI is not displaced. Exam reveals no gallop and no friction rub. No murmur heard. Pulses:       Carotid pulses are 2+ on the right side, and 2+ on the left side. Dorsalis pedis pulses are 2+ on the right side, and 2+ on the left side. Posterior tibial pulses are 2+ on the right side, and 2+ on the left side. Pulmonary/Chest: Effort normal and breath sounds normal. No stridor. No respiratory distress. He has no wheezes. He has no rales. He exhibits no tenderness. Abdominal: Soft. Bowel sounds are normal. He exhibits no distension and no mass. There is no tenderness. There is no rebound and no guarding. Musculoskeletal:        Right ankle: He exhibits no swelling. Left ankle: He exhibits no swelling. Lymphadenopathy:     He has no cervical adenopathy. Skin: He is not diaphoretic. Psychiatric: His speech is normal. Judgment and thought content normal. His mood appears anxious. His affect is labile. He is hyperactive. Cognition and memory are normal. He exhibits a depressed mood. 1. Chronic pain syndrome  Condition stable continue the medications, treatments, will check labs as appropriate  Has come off meds will not add     2. Bipolar affective disorder, mixed ([facility] [address]  The condition is deteriorating, will change treatment, investigate cause and make further recommendations when data back. Will add meds     3.  Generalized anxiety disorder  The condition is deteriorating, will

## 2017-10-09 ENCOUNTER — TELEPHONE (OUTPATIENT)
Dept: FAMILY MEDICINE CLINIC | Age: 42
End: 2017-10-09

## 2017-10-09 NOTE — TELEPHONE ENCOUNTER
Pts wife has an appt with Dr Chetna Larios at 1:30 tomorrow and her  would like to take that appt instead of her.  If possible,  Please let pt know if ok

## 2017-10-10 ENCOUNTER — OFFICE VISIT (OUTPATIENT)
Dept: FAMILY MEDICINE CLINIC | Age: 42
End: 2017-10-10

## 2017-10-10 ENCOUNTER — OFFICE VISIT (OUTPATIENT)
Dept: PSYCHOLOGY | Age: 42
End: 2017-10-10

## 2017-10-10 VITALS
BODY MASS INDEX: 27.34 KG/M2 | WEIGHT: 207.2 LBS | SYSTOLIC BLOOD PRESSURE: 126 MMHG | DIASTOLIC BLOOD PRESSURE: 84 MMHG | TEMPERATURE: 98.6 F

## 2017-10-10 DIAGNOSIS — L30.9 DERMATITIS: Primary | ICD-10-CM

## 2017-10-10 DIAGNOSIS — F31.60 BIPOLAR AFFECTIVE DISORDER, MIXED (HCC): Primary | ICD-10-CM

## 2017-10-10 DIAGNOSIS — F41.1 GENERALIZED ANXIETY DISORDER: ICD-10-CM

## 2017-10-10 DIAGNOSIS — R79.89 ELEVATED HOMOCYSTEINE: ICD-10-CM

## 2017-10-10 DIAGNOSIS — E78.2 MIXED HYPERLIPIDEMIA: ICD-10-CM

## 2017-10-10 PROCEDURE — 90832 PSYTX W PT 30 MINUTES: CPT | Performed by: PSYCHOLOGIST

## 2017-10-10 PROCEDURE — 99214 OFFICE O/P EST MOD 30 MIN: CPT | Performed by: FAMILY MEDICINE

## 2017-10-10 ASSESSMENT — ANXIETY QUESTIONNAIRES
GAD7 TOTAL SCORE: 18
4. TROUBLE RELAXING: 3-NEARLY EVERY DAY
7. FEELING AFRAID AS IF SOMETHING AWFUL MIGHT HAPPEN: 1-SEVERAL DAYS
6. BECOMING EASILY ANNOYED OR IRRITABLE: 3-NEARLY EVERY DAY
1. FEELING NERVOUS, ANXIOUS, OR ON EDGE: 3-NEARLY EVERY DAY
5. BEING SO RESTLESS THAT IT IS HARD TO SIT STILL: 3-NEARLY EVERY DAY
3. WORRYING TOO MUCH ABOUT DIFFERENT THINGS: 3-NEARLY EVERY DAY
2. NOT BEING ABLE TO STOP OR CONTROL WORRYING: 2-OVER HALF THE DAYS

## 2017-10-10 ASSESSMENT — PATIENT HEALTH QUESTIONNAIRE - PHQ9
9. THOUGHTS THAT YOU WOULD BE BETTER OFF DEAD, OR OF HURTING YOURSELF: 1
3. TROUBLE FALLING OR STAYING ASLEEP: 3
5. POOR APPETITE OR OVEREATING: 0
7. TROUBLE CONCENTRATING ON THINGS, SUCH AS READING THE NEWSPAPER OR WATCHING TELEVISION: 0
SUM OF ALL RESPONSES TO PHQ QUESTIONS 1-9: 12
1. LITTLE INTEREST OR PLEASURE IN DOING THINGS: 2
6. FEELING BAD ABOUT YOURSELF - OR THAT YOU ARE A FAILURE OR HAVE LET YOURSELF OR YOUR FAMILY DOWN: 1
4. FEELING TIRED OR HAVING LITTLE ENERGY: 2
2. FEELING DOWN, DEPRESSED OR HOPELESS: 1
SUM OF ALL RESPONSES TO PHQ9 QUESTIONS 1 & 2: 3
8. MOVING OR SPEAKING SO SLOWLY THAT OTHER PEOPLE COULD HAVE NOTICED. OR THE OPPOSITE, BEING SO FIGETY OR RESTLESS THAT YOU HAVE BEEN MOVING AROUND A LOT MORE THAN USUAL: 2

## 2017-10-10 ASSESSMENT — ENCOUNTER SYMPTOMS
EYES NEGATIVE: 1
GASTROINTESTINAL NEGATIVE: 1
RESPIRATORY NEGATIVE: 1

## 2017-10-10 NOTE — PROGRESS NOTES
Behavioral Health Consultation  Carlos A Lin Psy.D. Psychologist  10/10/2017  1:29 PM      Time spent with Patient: 30 minutes  This is patient's tenth [facility] appointment. Reason for Consult:  depression, anxiety and stress  Referring Provider: Sam Cam MD  [address], [address]      S:  Pt reported that his back surgery went really well. In a lot of pain during recovery. Stopped pain meds cold turkey, which caused withdrawal symptoms. Has been gaining weight due to unhealthy eating. Has started exercising again, which helps him feel better. Getting down emotionally. Discussed recent emotional outburst that felt like it came on out of nowhere. Discussed his efforts to get ahold of Dr. Jared Chau when he felt like he was in crisis. Dealing with rash on his face, which he believes is from Lamictal. Recently visited his childhood home because it was a place he felt safe. Discussed efforts to look for a new position. Shared about a negative interaction with a friend that led pt to end the friendship.       O:  MSE:  Appearance: good hygiene and appropriate attire  Attitude: polite, cooperative and moderate distress  Consciousness: alert  Orientation: oriented to person, place, time, general circumstance  Memory: recent and remote memory intact  Attention/Concentration: intact during session and impaired per self-report  Psychomotor Activity: normal  Eye Contact: normal  Speech: normal rate and volume, well-articulated, verbose  Mood: dyshporic and anxious  Affect: congruent with thought content and mood  Perception: within normal limits  Thought Content: within normal limits   Thought Process: logical, goal-directed, coherent   Insight: improving  Judgment: intact  Morbid ideation: passive thoughts of death  Suicide Assessment: no suicidal ideation       History:    Medications:   Current Outpatient Prescriptions   Medication Sig Dispense Refill    lamoTRIgine (LAMICTAL) 100 MG tablet intense exercise, progressive muscle relaxation) when you're feeling very overwhelmed, angry, etc. to calm yourself down quickly  6. Continue exercising regularly. Go for jogs in addition to the exercise you're already doing. 7. Continue paying attention to your skin picking. Notice how you're feeling and what you're thinking when you pick. 8. Complete ABC sheets to challenge negative thoughts  9. Rather than focusing on forgiveness and guilt, instead try saying to yourself \"That was then, this is now. \"  10. Hold your thoughts lightly. Remember to say \"thanks for sharing, Mind. \"  11. Practice visual imagery (\"taking a trip to the beach in your mind\")  12. Think about finding a new job that provides both financial and emotional stability  13. Get a book to help you learn to type. Start practicing typing 2 days per week. 14. Think about swimming as another exercise you can try  15. While reading, try to use active reading strategies to help yourself stay focused - highlighting, underlining, taking notes. Pt scheduled F/U visit in 2 weeks. Ongoing psychotropic medication mgmt with Dr. Cirilo Dowd.

## 2017-10-11 NOTE — PROGRESS NOTES
capsule by mouth 3 times daily 90 capsule 2    Nutritional Supplements (JUICE PLUS FIBRE PO) Take by mouth      atorvastatin (LIPITOR) 80 MG tablet Take 1 tablet by mouth daily 90 tablet 1       Past Medical History:   Diagnosis Date    Allergic     Anxiety     Cerebral artery occlusion with cerebral infarction ([address]     Chicken pox     Hyperlipidemia     Osteoarthritis     Testosterone deficiency      Past Surgical History:   Procedure Laterality Date    HAND SURGERY      after fracture    HERNIA REPAIR      right inguinal    KNEE ARTHROSCOPY      MENISCECTOMY      SHOULDER SURGERY      torn labrum 2.2013    WISDOM TOOTH EXTRACTION      WRIST FRACTURE SURGERY       Family History   Problem Relation Age of Onset    High Blood Pressure Mother     High Cholesterol Mother     Depression Mother     Substance Abuse Mother     Arthritis Father     High Blood Pressure Father     High Cholesterol Father     High Cholesterol Brother     Diabetes Maternal Grandmother     High Blood Pressure Maternal Grandmother     Bleeding Prob Maternal Grandfather     Stroke Maternal Grandfather     Diabetes Maternal Uncle      Social History     Social History    Marital status:      Spouse name: N/A    Number of children: N/A    Years of education: N/A     Occupational History    Not on file. Social History Main Topics    Smoking status: Never Smoker    Smokeless tobacco: Former User    Alcohol use No    Drug use: No    Sexual activity: Yes     Other Topics Concern    Not on file     Social History Narrative    No narrative on file         Any recent diagnostic tests, hospital reports, office notes or consultation letters were reviewed prior to and during the visit. Review of Systems   Constitutional: Negative. HENT: Negative. Eyes: Negative. Respiratory: Negative. Cardiovascular: Negative. Gastrointestinal: Negative. Genitourinary: Negative.     Musculoskeletal:

## 2017-10-16 ENCOUNTER — OFFICE VISIT (OUTPATIENT)
Dept: PSYCHIATRY | Age: 42
End: 2017-10-16

## 2017-10-16 VITALS
WEIGHT: 210 LBS | BODY MASS INDEX: 27.83 KG/M2 | SYSTOLIC BLOOD PRESSURE: 130 MMHG | HEIGHT: 73 IN | HEART RATE: 73 BPM | OXYGEN SATURATION: 99 % | DIASTOLIC BLOOD PRESSURE: 90 MMHG

## 2017-10-16 DIAGNOSIS — F31.32 BIPOLAR AFFECTIVE DISORDER, CURRENTLY DEPRESSED, MODERATE (HCC): Primary | ICD-10-CM

## 2017-10-16 DIAGNOSIS — F41.1 GAD (GENERALIZED ANXIETY DISORDER): ICD-10-CM

## 2017-10-16 PROCEDURE — 99214 OFFICE O/P EST MOD 30 MIN: CPT | Performed by: PSYCHIATRY & NEUROLOGY

## 2017-10-16 RX ORDER — LAMOTRIGINE 100 MG/1
150 TABLET ORAL DAILY
Qty: 60 TABLET | Refills: 2 | Status: SHIPPED | OUTPATIENT
Start: 2017-10-16 | End: 2017-11-13 | Stop reason: SDUPTHER

## 2017-10-16 RX ORDER — MIRTAZAPINE 7.5 MG/1
15 TABLET, FILM COATED ORAL NIGHTLY
Qty: 30 TABLET | Refills: 2 | Status: SHIPPED | OUTPATIENT
Start: 2017-10-16 | End: 2017-11-13 | Stop reason: SDUPTHER

## 2017-10-16 ASSESSMENT — ENCOUNTER SYMPTOMS
EYES NEGATIVE: 1
GASTROINTESTINAL NEGATIVE: 1
RESPIRATORY NEGATIVE: 1

## 2017-10-16 NOTE — PROGRESS NOTES
Outpatient Psychiatric Progress Note  Meredith Mcgregor M.D. Date: 10/16/2017    Total Duration of Visit: 25min    Vital Signs: BP (!) 130/90 (Site: Left Arm, Position: Sitting, Cuff Size: Large Adult)   Pulse 73   Ht 6' 1\" (1.854 m)   Wt 210 lb (95.3 kg)   SpO2 99%   BMI 27.71 kg/m²     Chief Complaint/Reason For Visit:   Chief Complaint   Patient presents with    Anxiety     bipolar disorder        Interval History: Pt very depressed. Increased rage. Poor sleep. Few weeks ago stopped all opiate medications that he had been taking from back pain/ surgery (was on for 1 year). 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 Pt reports he [address] and wants to live in [place] [facility] again. Wife doesn't want to move back. In the midst of his plummeting mood he was unable to contact me which angered him further. Dr. Alverto Varghese and Dr. Ceci Dias worked with him and he agreed to meet with me again. Had increased lamictal to 200mg then pt presented with rash. Lamictal reduced to 100mg again and rash resolved with hydrocortisone (from Dr. Ceci Dias). Pt with dreams about death and murders (?PTSD related) keeping biting fingernail and skin. Says this time of year he always gets worse. +hopeless. No SI. Energy little better and starting to exercise. Looking forward to returning to work. Concerned about weight gain because of CAD. Poor sex drive. Used clonidine briefly to help with opiate withdrawal.     MSE:     Appropriate grooming and hygiene. Appears stated age. Good eye contact. Speech spontaneous and non-pressured. Mood depressed, irritable  Affect congruent. TP linear. TC free of SI/HI, no evidence of IOR/IOP. Pt denies any perceptual abnormalities. The pt did not exhibit abnormal movements or tremor. The pt was fidgeting in chair  The pt was alert and oriented in all spheres. Pt demonstrated good fund of knowledge and good recall of recent and remote events.   The patient demonstrated an appropriate level of insight into their diagnosis and treatment recommendations, including risks, benefits and alternatives of the recommended medications and therapy interventions. Review of Systems   Constitutional: Negative. HENT: Negative. Eyes: Negative. Respiratory: Negative. Cardiovascular: Negative. Gastrointestinal: Negative. Genitourinary: Negative. Skin: Negative. Neurological: Negative. Endo/Heme/Allergies: Negative. Psychiatric/Behavioral: Positive for depression. The patient is nervous/anxious. Current Medications:   Current Outpatient Prescriptions   Medication Sig Dispense Refill    lamoTRIgine (LAMICTAL) 100 MG tablet Take 1.5 tablets by mouth daily For 2 weeks then increase to 200mg daily 60 tablet 2    mirtazapine (REMERON) 7.5 MG tablet Take 2 tablets by mouth nightly 30 tablet 2    fenofibrate (TRICOR) 145 MG tablet TAKE ONE TABLET BY MOUTH DAILY 21 tablet 0    gabapentin (NEURONTIN) 300 MG capsule Take 1 capsule by mouth 3 times daily 90 capsule 2    Nutritional Supplements (JUICE PLUS FIBRE PO) Take by mouth      atorvastatin (LIPITOR) 80 MG tablet Take 1 tablet by mouth daily 90 tablet 1     No current facility-administered medications for this visit.         Labs/Imaging/EKG:    Orders Only on 10/04/2017   Component Date Value Ref Range Status    WBC 10/04/2017 8.2  4.0 - 11.0 K/uL Final    RBC 10/04/2017 4.43  4.20 - 5.90 M/uL Final    Hemoglobin 10/04/2017 14.1  13.5 - 17.5 g/dL Final    Hematocrit 10/04/2017 41.1  40.5 - 52.5 % Final    MCV 10/04/2017 92.7  80.0 - 100.0 fL Final    MCH 10/04/2017 31.9  26.0 - 34.0 pg Final    MCHC 10/04/2017 34.4  31.0 - 36.0 g/dL Final    RDW 10/04/2017 13.3  12.4 - 15.4 % Final    Platelets 86/04/1320 355  135 - 450 K/uL Final    MPV 10/04/2017 9.0  5.0 - 10.5 fL Final    Neutrophils % 10/04/2017 57.7  % Final    Lymphocytes % 10/04/2017 30.0  % Final    Monocytes % 10/04/2017 8.2  % Final    Eosinophils % 10/04/2017 3.1  % Final    Basophils % 10/04/2017 1.0  % Final    Neutrophils # 10/04/2017 4.7  1.7 - 7.7 K/uL Final    Lymphocytes # 10/04/2017 2.5  1.0 - 5.1 K/uL Final    Monocytes # 10/04/2017 0.7  0.0 - 1.3 K/uL Final    Eosinophils # 10/04/2017 0.3  0.0 - 0.6 K/uL Final    Basophils # 10/04/2017 0.1  0.0 - 0.2 K/uL Final    Sodium 10/04/2017 141  136 - 145 mmol/L Final    Potassium 10/04/2017 4.6  3.5 - 5.1 mmol/L Final    Chloride 10/04/2017 100  99 - 110 mmol/L Final    CO2 10/04/2017 24  21 - 32 mmol/L Final    Anion Gap 10/04/2017 17* 3 - 16 Final    Glucose 10/04/2017 94  70 - 99 mg/dL Final    BUN 10/04/2017 13  7 - 20 mg/dL Final    CREATININE 10/04/2017 0.7* 0.9 - 1.3 mg/dL Final    GFR Non- 10/04/2017 >60  >60 Final    GFR  10/04/2017 >60  >60 Final    Calcium 10/04/2017 10.2  8.3 - 10.6 mg/dL Final    Total Protein 10/04/2017 7.4  6.4 - 8.2 g/dL Final    Alb 10/04/2017 4.8  3.4 - 5.0 g/dL Final    Albumin/Globulin Ratio 10/04/2017 1.8  1.1 - 2.2 Final    Total Bilirubin 10/04/2017 <0.2  0.0 - 1.0 mg/dL Final    Alkaline Phosphatase 10/04/2017 84  40 - 129 U/L Final    ALT 10/04/2017 55* 10 - 40 U/L Final    AST 10/04/2017 103* 15 - 37 U/L Final    Globulin 10/04/2017 2.6  g/dL Final    TSH 10/04/2017 1.55  0.27 - 4.20 uIU/mL Final    Vit D, 25-Hydroxy 10/04/2017 29.3* >=30 ng/mL Final   Orders Only on 08/21/2017   Component Date Value Ref Range Status    Color, UA 08/21/2017 Straw  Yellow,Straw Final    Clarity, UA 08/21/2017 Clear  Clear Final    Glucose, Ur 08/21/2017 Negative  Negative mg/dL Final    Bilirubin Urine 08/21/2017 Negative  Negative Final    Ketones, Urine 08/21/2017 Negative  Negative mg/dL Final    Specific Gravity, UA 08/21/2017 1.018  1.005 - 1.035 Final    Erythrocytes, Urine 08/21/2017 Small* Negative Final    pH, UA 08/21/2017 6.0  5.0 - 8.0 Final    Protein, UA 08/21/2017

## 2017-10-24 ENCOUNTER — OFFICE VISIT (OUTPATIENT)
Dept: PSYCHOLOGY | Age: 42
End: 2017-10-24

## 2017-10-24 DIAGNOSIS — F31.60 BIPOLAR AFFECTIVE DISORDER, MIXED (HCC): Primary | ICD-10-CM

## 2017-10-24 DIAGNOSIS — F41.1 GENERALIZED ANXIETY DISORDER: ICD-10-CM

## 2017-10-24 PROCEDURE — 90832 PSYTX W PT 30 MINUTES: CPT | Performed by: PSYCHOLOGIST

## 2017-10-24 PROCEDURE — 1036F TOBACCO NON-USER: CPT | Performed by: PSYCHOLOGIST

## 2017-10-24 ASSESSMENT — ANXIETY QUESTIONNAIRES
6. BECOMING EASILY ANNOYED OR IRRITABLE: 2-OVER HALF THE DAYS
7. FEELING AFRAID AS IF SOMETHING AWFUL MIGHT HAPPEN: 0-NOT AT ALL SURE
3. WORRYING TOO MUCH ABOUT DIFFERENT THINGS: 1-SEVERAL DAYS
5. BEING SO RESTLESS THAT IT IS HARD TO SIT STILL: 2-OVER HALF THE DAYS
4. TROUBLE RELAXING: 2-OVER HALF THE DAYS
2. NOT BEING ABLE TO STOP OR CONTROL WORRYING: 1-SEVERAL DAYS
1. FEELING NERVOUS, ANXIOUS, OR ON EDGE: 2-OVER HALF THE DAYS
GAD7 TOTAL SCORE: 10

## 2017-10-24 ASSESSMENT — PATIENT HEALTH QUESTIONNAIRE - PHQ9
1. LITTLE INTEREST OR PLEASURE IN DOING THINGS: 0
8. MOVING OR SPEAKING SO SLOWLY THAT OTHER PEOPLE COULD HAVE NOTICED. OR THE OPPOSITE, BEING SO FIGETY OR RESTLESS THAT YOU HAVE BEEN MOVING AROUND A LOT MORE THAN USUAL: 0
SUM OF ALL RESPONSES TO PHQ9 QUESTIONS 1 & 2: 1
3. TROUBLE FALLING OR STAYING ASLEEP: 1
5. POOR APPETITE OR OVEREATING: 0
2. FEELING DOWN, DEPRESSED OR HOPELESS: 1
6. FEELING BAD ABOUT YOURSELF - OR THAT YOU ARE A FAILURE OR HAVE LET YOURSELF OR YOUR FAMILY DOWN: 0
SUM OF ALL RESPONSES TO PHQ QUESTIONS 1-9: 3
7. TROUBLE CONCENTRATING ON THINGS, SUCH AS READING THE NEWSPAPER OR WATCHING TELEVISION: 1
9. THOUGHTS THAT YOU WOULD BE BETTER OFF DEAD, OR OF HURTING YOURSELF: 0
4. FEELING TIRED OR HAVING LITTLE ENERGY: 0

## 2017-10-24 NOTE — Clinical Note
Pt is doing much better since last visit. FYI he's taking 1 gabapentin in the AM and then doubling up his PM dose because he forgets to take it mid-day. Just want to make sure you're aware he's taking it this way. Is this okay? The [address] referral is a great idea, Bill Marinelli, and he seems open to it, although he hasn't called yet. I encouraged him to consider which traumas have been more troubling to him since they will try to fátima in. I've had pts start off by talking about the least troubling traumas and then be turned away as a result.

## 2017-10-24 NOTE — PATIENT INSTRUCTIONS
Goals: 1. Practice being mindful - paying attention to the present moment in a nonjudgmental way  2. Practice diaphragmatic breathing throughout the day  3. Practice progressive muscle relaxation for 5 minutes daily  4. Practice grounding exercises - noticing what your 5 senses are experiencing in the moment  5. Try TIP (temperature, intense exercise, progressive muscle relaxation) when you're feeling very overwhelmed, angry, etc. to calm yourself down quickly  6. Continue exercising regularly. Go for jogs in addition to the exercise you're already doing. 7. Continue paying attention to your skin picking. Notice how you're feeling and what you're thinking when you pick. 8. Complete ABC sheets to challenge negative thoughts  9. Rather than focusing on forgiveness and guilt, instead try saying to yourself \"That was then, this is now. \"  10. Hold your thoughts lightly. Remember to say \"thanks for sharing, Mind. \"  11. Practice visual imagery (\"taking a trip to the beach in your mind\")  12. Think about finding a new job that provides both financial and emotional stability  13. Get a book to help you learn to type. Start practicing typing 2 days per week. 14. Think about swimming as another exercise you can try  15. While reading, try to use active reading strategies to help yourself stay focused - highlighting, underlining, taking notes.
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
## 2017-10-31 DIAGNOSIS — E78.2 MIXED HYPERLIPIDEMIA: ICD-10-CM

## 2017-10-31 RX ORDER — FENOFIBRATE 145 MG/1
145 TABLET, COATED ORAL DAILY
Qty: 90 TABLET | Refills: 1 | Status: SHIPPED | OUTPATIENT
Start: 2017-10-31 | End: 2018-07-02 | Stop reason: SDUPTHER

## 2017-10-31 RX ORDER — ATORVASTATIN CALCIUM 80 MG/1
80 TABLET, FILM COATED ORAL DAILY
Qty: 90 TABLET | Refills: 1 | Status: SHIPPED | OUTPATIENT
Start: 2017-10-31 | End: 2018-04-05 | Stop reason: SDUPTHER

## 2017-10-31 NOTE — TELEPHONE ENCOUNTER
From: Sandra Gomez  Sent: 10/31/2017 7:26 AM EDT  Subject: Medication Renewal Request    Brand Nery Souza would like a refill of the following medications:  atorvastatin (LIPITOR) 80 MG tablet [Fuentes Tena MD]  fenofibrate (TRICOR) 145 MG tablet Cammie Joshua MD]    Preferred pharmacy: [address], [address] [place] [address] -  [phone]    Comment:

## 2017-11-06 DIAGNOSIS — E78.2 MIXED HYPERLIPIDEMIA: ICD-10-CM

## 2017-11-06 DIAGNOSIS — R79.89 ELEVATED HOMOCYSTEINE: ICD-10-CM

## 2017-11-06 LAB
CHOLESTEROL, TOTAL: 250 MG/DL (ref 0–199)
HDLC SERPL-MCNC: 51 MG/DL (ref 40–60)
HOMOCYSTEINE: 8 UMOL/L (ref 0–10)
LDL CHOLESTEROL CALCULATED: ABNORMAL MG/DL
LDL CHOLESTEROL DIRECT: 145 MG/DL
TRIGL SERPL-MCNC: 358 MG/DL (ref 0–150)
VLDLC SERPL CALC-MCNC: ABNORMAL MG/DL

## 2017-11-07 ENCOUNTER — PATIENT MESSAGE (OUTPATIENT)
Dept: FAMILY MEDICINE CLINIC | Age: 42
End: 2017-11-07

## 2017-11-07 DIAGNOSIS — E78.2 MIXED HYPERLIPIDEMIA: Primary | ICD-10-CM

## 2017-11-08 RX ORDER — EZETIMIBE 10 MG/1
10 TABLET ORAL DAILY
Qty: 30 TABLET | Refills: 3 | Status: SHIPPED | OUTPATIENT
Start: 2017-11-08 | End: 2018-07-01 | Stop reason: SDUPTHER

## 2017-11-13 ENCOUNTER — INITIAL CONSULT (OUTPATIENT)
Dept: PSYCHIATRY | Age: 42
End: 2017-11-13

## 2017-11-13 VITALS
HEIGHT: 73 IN | DIASTOLIC BLOOD PRESSURE: 80 MMHG | HEART RATE: 65 BPM | BODY MASS INDEX: 28.23 KG/M2 | OXYGEN SATURATION: 98 % | SYSTOLIC BLOOD PRESSURE: 130 MMHG | WEIGHT: 213 LBS

## 2017-11-13 DIAGNOSIS — F31.70 BIPOLAR DISORDER IN PARTIAL REMISSION, MOST RECENT EPISODE UNSPECIFIED TYPE (HCC): ICD-10-CM

## 2017-11-13 DIAGNOSIS — F41.1 GAD (GENERALIZED ANXIETY DISORDER): Primary | ICD-10-CM

## 2017-11-13 PROCEDURE — 99214 OFFICE O/P EST MOD 30 MIN: CPT | Performed by: PSYCHIATRY & NEUROLOGY

## 2017-11-13 RX ORDER — GABAPENTIN 300 MG/1
300 CAPSULE ORAL 2 TIMES DAILY
Qty: 60 CAPSULE | Refills: 2 | Status: SHIPPED | OUTPATIENT
Start: 2017-11-13 | End: 2018-02-12

## 2017-11-13 RX ORDER — LAMOTRIGINE 200 MG/1
200 TABLET ORAL DAILY
Qty: 30 TABLET | Refills: 2 | Status: SHIPPED | OUTPATIENT
Start: 2017-11-13 | End: 2018-02-12 | Stop reason: SDUPTHER

## 2017-11-13 RX ORDER — MIRTAZAPINE 15 MG/1
15 TABLET, FILM COATED ORAL NIGHTLY
Qty: 30 TABLET | Refills: 3 | Status: SHIPPED | OUTPATIENT
Start: 2017-11-13 | End: 2018-02-12

## 2017-11-13 ASSESSMENT — ENCOUNTER SYMPTOMS
GASTROINTESTINAL NEGATIVE: 1
RESPIRATORY NEGATIVE: 1
EYES NEGATIVE: 1

## 2017-11-13 NOTE — PROGRESS NOTES
Outpatient Psychiatric Progress Note  Jag Tejada M.D. Date: 11/13/2017    Total Duration of Visit: 25min    Vital Signs: /80   Pulse 65   Ht 6' 1\" (1.854 m)   Wt 213 lb (96.6 kg)   SpO2 98%   BMI 28.10 kg/m²     Chief Complaint/Reason For Visit:   Chief Complaint   Patient presents with    Depression     bipolar disorder    Anxiety        Interval History: Pt doing better. Mood better although still some anxiety and low threshold at home with kids and wife. 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 Will be rotating through the different shifts so he will know responsibilties of each one. Tolerated reintro of lamictal and no rash. Took remeron 15mg HS and felt oversedated. Went to 7.5mg and tolerates but still doesn't sleep well. On new diet. Concerned abut lipids and started on new med. No SI/HI. No depression. Good energy. No hypomania or anny. No AVH. Some fidgeting. No sexual se from medications. No pain. MSE:     Appropriate grooming and hygiene. Appears stated age. Good eye contact. Speech spontaneous and non-pressured. Mood depressed, irritable  Affect congruent. TP linear. TC free of SI/HI, no evidence of IOR/IOP. Pt denies any perceptual abnormalities. The pt did not exhibit abnormal movements or tremor. The pt was fidgeting in chair  The pt was alert and oriented in all spheres. Pt demonstrated good fund of knowledge and good recall of recent and remote events. The patient demonstrated an appropriate level of insight into their diagnosis and treatment recommendations, including risks, benefits and alternatives of the recommended medications and therapy interventions. Review of Systems   Constitutional: Negative. HENT: Negative. Eyes: Negative. Respiratory: Negative. Cardiovascular: Negative. Gastrointestinal: Negative. Genitourinary: Negative. Skin: Negative. Neurological: Negative. Endo/Heme/Allergies: Negative. 10/04/2017 57.7  % Final    Lymphocytes % 10/04/2017 30.0  % Final    Monocytes % 10/04/2017 8.2  % Final    Eosinophils % 10/04/2017 3.1  % Final    Basophils % 10/04/2017 1.0  % Final    Neutrophils # 10/04/2017 4.7  1.7 - 7.7 K/uL Final    Lymphocytes # 10/04/2017 2.5  1.0 - 5.1 K/uL Final    Monocytes # 10/04/2017 0.7  0.0 - 1.3 K/uL Final    Eosinophils # 10/04/2017 0.3  0.0 - 0.6 K/uL Final    Basophils # 10/04/2017 0.1  0.0 - 0.2 K/uL Final    Sodium 10/04/2017 141  136 - 145 mmol/L Final    Potassium 10/04/2017 4.6  3.5 - 5.1 mmol/L Final    Chloride 10/04/2017 100  99 - 110 mmol/L Final    CO2 10/04/2017 24  21 - 32 mmol/L Final    Anion Gap 10/04/2017 17* 3 - 16 Final    Glucose 10/04/2017 94  70 - 99 mg/dL Final    BUN 10/04/2017 13  7 - 20 mg/dL Final    CREATININE 10/04/2017 0.7* 0.9 - 1.3 mg/dL Final    GFR Non- 10/04/2017 >60  >60 Final    GFR  10/04/2017 >60  >60 Final    Calcium 10/04/2017 10.2  8.3 - 10.6 mg/dL Final    Total Protein 10/04/2017 7.4  6.4 - 8.2 g/dL Final    Alb 10/04/2017 4.8  3.4 - 5.0 g/dL Final    Albumin/Globulin Ratio 10/04/2017 1.8  1.1 - 2.2 Final    Total Bilirubin 10/04/2017 <0.2  0.0 - 1.0 mg/dL Final    Alkaline Phosphatase 10/04/2017 84  40 - 129 U/L Final    ALT 10/04/2017 55* 10 - 40 U/L Final    AST 10/04/2017 103* 15 - 37 U/L Final    Globulin 10/04/2017 2.6  g/dL Final    TSH 10/04/2017 1.55  0.27 - 4.20 uIU/mL Final    Vit D, 25-Hydroxy 10/04/2017 29.3* >=30 ng/mL Final   Orders Only on 08/21/2017   Component Date Value Ref Range Status    Color, UA 08/21/2017 Straw  Yellow,Straw Final    Clarity, UA 08/21/2017 Clear  Clear Final    Glucose, Ur 08/21/2017 Negative  Negative mg/dL Final    Bilirubin Urine 08/21/2017 Negative  Negative Final    Ketones, Urine 08/21/2017 Negative  Negative mg/dL Final    Specific Gravity, UA 08/21/2017 1.018  1.005 - 1.035 Final    Erythrocytes, Urine 08/21/2017 Small* Negative Final    pH, UA 08/21/2017 6.0  5.0 - 8.0 Final    Protein, UA 08/21/2017 Negative  Negative mg/dL Final    Urobilinogen, Urine 08/21/2017 <2.0  <2.0 mg/dL Final    Nitrite, Urine 08/21/2017 Negative  Negative Final    Leukocyte Esterase, Urine 08/21/2017 Negative  Negative Final    RBC, UA 08/21/2017 1  0 - 3 /HPF Final    LEUKOCYTES, UA 08/21/2017 0  0 - 5 /HPF Final   Orders Only on 08/17/2017   Component Date Value Ref Range Status    aPTT 08/17/2017 33.4  23.1 - 37.6 seconds Final    Antibody Screen 08/17/2017 Negative   Final    ABO Grouping 08/17/2017 A   Final    Rh Factor 08/17/2017 Positive   Final    Cotinine, Urine 08/17/2017 339  0 - 500 ng/mL Final    Bacteria Identified 08/18/2017 *  Final                    Value:1,000-10,000 cfu/mL  Beta Hemolytic Streptococcus Group B  Beta hemolytic streptococci are predictably susceptible to penicillin and other beta-lactams. Susceptibility testing not routinely performed.       clinical report 08/19/2017 DELETED   Final   Orders Only on 08/17/2017   Component Date Value Ref Range Status    Protime 08/17/2017 12.0  9.4 - 12.6 seconds Final    INR 08/17/2017 1.1  0.9 - 1.1 Final   Orders Only on 08/17/2017   Component Date Value Ref Range Status    WBC 08/17/2017 5.8  3.8 - 10.8 10*3/uL Final    RBC Morphology 08/17/2017 4.64  4.20 - 5.80 10*6/uL Final    Hemoglobin 08/17/2017 14.5  13.2 - 17.1 g/dL Final    Hematocrit 08/17/2017 41.8  38.5 - 50.0 % Final    MCV 08/17/2017 90.0  80.0 - 100.0 fL Final    MCH 08/17/2017 31.3  27.0 - 33.0 pg Final    MCHC 08/17/2017 34.7  32.0 - 36.0 g/dL Final    RDW 08/17/2017 13.1  11.0 - 15.0 % Final    Platelets 94/90/1775 264  140 - 400 10*3/uL Final    MPV 08/17/2017 9.3  7.5 - 11.5 fL Final   Orders Only on 08/17/2017   Component Date Value Ref Range Status    Color, UA 08/17/2017 Yellow  Yellow,Straw Final    Clarity, UA 08/17/2017 Clear  Clear Final    Glucose, Ur 08/17/2017 Negative  Negative mg/dL Final    Bilirubin Urine 08/17/2017 Negative  Negative Final    Ketones, Urine 08/17/2017 Negative  Negative mg/dL Final    Specific Gravity, UA 08/17/2017 1.016  1.005 - 1.035 Final    Erythrocytes, Urine 08/17/2017 Negative  Negative Final    pH, UA 08/17/2017 6.0  5.0 - 8.0 Final    Protein, UA 08/17/2017 Negative  Negative mg/dL Final    Urobilinogen, Urine 08/17/2017 <2.0  <2.0 mg/dL Final    Nitrite, Urine 08/17/2017 Negative  Negative Final    Leukocyte Esterase, Urine 08/17/2017 Negative  Negative Final   Orders Only on 08/17/2017   Component Date Value Ref Range Status    Carboxyhemoglobin 08/17/2017 1.9  % Final         Assessment:  Diagnosis:   1. Bipolar disorder in partial remission, most recent episode unspecified type [facility])         Recommendations:  1. BAD: depression resolved. Some residual axiety and frustration with kids. Sleep poor. Continue lamictal  200mg daily. Monitor for rash. Increase remeron to 15mg HS. Reviewed R/B/SE. This is for sleep. Will increase for depression and anxiety. No sexual SE which pt very concerned about. Did educate about potential increase weight. No safety concerns  Continue with Dr. Alverto Varghese  F/U in 3 month  2. LINDA  Surgeon changed gabapentin to 300mg TID for pain. Pt would like to discontinue.  Rec slow reduction as may be having some benefits such as pain and anxiety.

## 2017-12-21 ENCOUNTER — PATIENT MESSAGE (OUTPATIENT)
Dept: FAMILY MEDICINE CLINIC | Age: 42
End: 2017-12-21

## 2017-12-21 NOTE — TELEPHONE ENCOUNTER
From: Cammie Dorado  To: Renato Miller MD  Sent: 12/21/2017 1:10 PM EST  Subject: Non-Urgent Medical Question    Hi Doc. I wanted to wait until the first of the year but I am exhausted every day. I will have 2 cups of coffee and still exhausted. I'm not sure what's going on. Thoughts? This has been going on for the last 3 weeks straight and continues to get worse.  Thank you

## 2018-01-16 ENCOUNTER — INITIAL CONSULT (OUTPATIENT)
Dept: PSYCHIATRY | Age: 43
End: 2018-01-16

## 2018-01-16 VITALS
HEIGHT: 72 IN | SYSTOLIC BLOOD PRESSURE: 138 MMHG | DIASTOLIC BLOOD PRESSURE: 88 MMHG | BODY MASS INDEX: 30.07 KG/M2 | WEIGHT: 222 LBS

## 2018-01-16 DIAGNOSIS — F43.10 PTSD (POST-TRAUMATIC STRESS DISORDER): ICD-10-CM

## 2018-01-16 DIAGNOSIS — F41.9 ANXIETY: ICD-10-CM

## 2018-01-16 DIAGNOSIS — F31.70 BIPOLAR DISORDER IN FULL REMISSION, MOST RECENT EPISODE UNSPECIFIED TYPE (HCC): Primary | ICD-10-CM

## 2018-01-16 PROCEDURE — 99214 OFFICE O/P EST MOD 30 MIN: CPT | Performed by: PSYCHIATRY & NEUROLOGY

## 2018-01-16 ASSESSMENT — ENCOUNTER SYMPTOMS
GASTROINTESTINAL NEGATIVE: 1
EYES NEGATIVE: 1
RESPIRATORY NEGATIVE: 1

## 2018-01-16 NOTE — PROGRESS NOTES
10/04/2017 1.0  % Final    Neutrophils # 10/04/2017 4.7  1.7 - 7.7 K/uL Final    Lymphocytes # 10/04/2017 2.5  1.0 - 5.1 K/uL Final    Monocytes # 10/04/2017 0.7  0.0 - 1.3 K/uL Final    Eosinophils # 10/04/2017 0.3  0.0 - 0.6 K/uL Final    Basophils # 10/04/2017 0.1  0.0 - 0.2 K/uL Final    Sodium 10/04/2017 141  136 - 145 mmol/L Final    Potassium 10/04/2017 4.6  3.5 - 5.1 mmol/L Final    Chloride 10/04/2017 100  99 - 110 mmol/L Final    CO2 10/04/2017 24  21 - 32 mmol/L Final    Anion Gap 10/04/2017 17* 3 - 16 Final    Glucose 10/04/2017 94  70 - 99 mg/dL Final    BUN 10/04/2017 13  7 - 20 mg/dL Final    CREATININE 10/04/2017 0.7* 0.9 - 1.3 mg/dL Final    GFR Non- 10/04/2017 >60  >60 Final    GFR  10/04/2017 >60  >60 Final    Calcium 10/04/2017 10.2  8.3 - 10.6 mg/dL Final    Total Protein 10/04/2017 7.4  6.4 - 8.2 g/dL Final    Alb 10/04/2017 4.8  3.4 - 5.0 g/dL Final    Albumin/Globulin Ratio 10/04/2017 1.8  1.1 - 2.2 Final    Total Bilirubin 10/04/2017 <0.2  0.0 - 1.0 mg/dL Final    Alkaline Phosphatase 10/04/2017 84  40 - 129 U/L Final    ALT 10/04/2017 55* 10 - 40 U/L Final    AST 10/04/2017 103* 15 - 37 U/L Final    Globulin 10/04/2017 2.6  g/dL Final    TSH 10/04/2017 1.55  0.27 - 4.20 uIU/mL Final    Vit D, 25-Hydroxy 10/04/2017 29.3* >=30 ng/mL Final   Orders Only on 08/21/2017   Component Date Value Ref Range Status    Color, UA 08/21/2017 Straw  Yellow,Straw Final    Clarity, UA 08/21/2017 Clear  Clear Final    Glucose, Ur 08/21/2017 Negative  Negative mg/dL Final    Bilirubin Urine 08/21/2017 Negative  Negative Final    Ketones, Urine 08/21/2017 Negative  Negative mg/dL Final    Specific Gravity, UA 08/21/2017 1.018  1.005 - 1.035 Final    Erythrocytes, Urine 08/21/2017 Small* Negative Final    pH, UA 08/21/2017 6.0  5.0 - 8.0 Final    Protein, UA 08/21/2017 Negative  Negative mg/dL Final    Urobilinogen, Urine 08/21/2017 <2.0  <2.0

## 2018-02-12 ENCOUNTER — INITIAL CONSULT (OUTPATIENT)
Dept: PSYCHIATRY | Age: 43
End: 2018-02-12

## 2018-02-12 VITALS
HEIGHT: 72 IN | WEIGHT: 212 LBS | SYSTOLIC BLOOD PRESSURE: 130 MMHG | OXYGEN SATURATION: 98 % | HEART RATE: 62 BPM | BODY MASS INDEX: 28.71 KG/M2 | DIASTOLIC BLOOD PRESSURE: 80 MMHG

## 2018-02-12 DIAGNOSIS — F41.1 GAD (GENERALIZED ANXIETY DISORDER): ICD-10-CM

## 2018-02-12 DIAGNOSIS — F43.10 PTSD (POST-TRAUMATIC STRESS DISORDER): ICD-10-CM

## 2018-02-12 DIAGNOSIS — F31.70 BIPOLAR DISORDER IN FULL REMISSION, MOST RECENT EPISODE UNSPECIFIED TYPE (HCC): Primary | ICD-10-CM

## 2018-02-12 DIAGNOSIS — G47.00 INSOMNIA, UNSPECIFIED TYPE: ICD-10-CM

## 2018-02-12 DIAGNOSIS — F31.70 BIPOLAR DISORDER IN PARTIAL REMISSION, MOST RECENT EPISODE UNSPECIFIED TYPE (HCC): ICD-10-CM

## 2018-02-12 PROCEDURE — 99214 OFFICE O/P EST MOD 30 MIN: CPT | Performed by: PSYCHIATRY & NEUROLOGY

## 2018-02-12 RX ORDER — MIRTAZAPINE 15 MG/1
7.5 TABLET, FILM COATED ORAL NIGHTLY
Qty: 30 TABLET | Refills: 2 | Status: SHIPPED | OUTPATIENT
Start: 2018-02-12 | End: 2018-03-29 | Stop reason: ALTCHOICE

## 2018-02-12 RX ORDER — LAMOTRIGINE 200 MG/1
200 TABLET ORAL DAILY
Qty: 30 TABLET | Refills: 5 | Status: SHIPPED | OUTPATIENT
Start: 2018-02-12 | End: 2018-02-12

## 2018-02-12 RX ORDER — LAMOTRIGINE 200 MG/1
200 TABLET ORAL DAILY
Qty: 30 TABLET | Refills: 2 | Status: SHIPPED | OUTPATIENT
Start: 2018-02-12 | End: 2018-05-14 | Stop reason: SDUPTHER

## 2018-02-12 ASSESSMENT — ENCOUNTER SYMPTOMS
GASTROINTESTINAL NEGATIVE: 1
EYES NEGATIVE: 1
RESPIRATORY NEGATIVE: 1

## 2018-02-12 NOTE — PROGRESS NOTES
Monocytes # 10/04/2017 0.7  0.0 - 1.3 K/uL Final    Eosinophils # 10/04/2017 0.3  0.0 - 0.6 K/uL Final    Basophils # 10/04/2017 0.1  0.0 - 0.2 K/uL Final    Sodium 10/04/2017 141  136 - 145 mmol/L Final    Potassium 10/04/2017 4.6  3.5 - 5.1 mmol/L Final    Chloride 10/04/2017 100  99 - 110 mmol/L Final    CO2 10/04/2017 24  21 - 32 mmol/L Final    Anion Gap 10/04/2017 17* 3 - 16 Final    Glucose 10/04/2017 94  70 - 99 mg/dL Final    BUN 10/04/2017 13  7 - 20 mg/dL Final    CREATININE 10/04/2017 0.7* 0.9 - 1.3 mg/dL Final    GFR Non- 10/04/2017 >60  >60 Final    GFR  10/04/2017 >60  >60 Final    Calcium 10/04/2017 10.2  8.3 - 10.6 mg/dL Final    Total Protein 10/04/2017 7.4  6.4 - 8.2 g/dL Final    Alb 10/04/2017 4.8  3.4 - 5.0 g/dL Final    Albumin/Globulin Ratio 10/04/2017 1.8  1.1 - 2.2 Final    Total Bilirubin 10/04/2017 <0.2  0.0 - 1.0 mg/dL Final    Alkaline Phosphatase 10/04/2017 84  40 - 129 U/L Final    ALT 10/04/2017 55* 10 - 40 U/L Final    AST 10/04/2017 103* 15 - 37 U/L Final    Globulin 10/04/2017 2.6  g/dL Final    TSH 10/04/2017 1.55  0.27 - 4.20 uIU/mL Final    Vit D, 25-Hydroxy 10/04/2017 29.3* >=30 ng/mL Final   Orders Only on 08/21/2017   Component Date Value Ref Range Status    Color, UA 08/21/2017 Straw  Yellow,Straw Final    Clarity, UA 08/21/2017 Clear  Clear Final    Glucose, Ur 08/21/2017 Negative  Negative mg/dL Final    Bilirubin Urine 08/21/2017 Negative  Negative Final    Ketones, Urine 08/21/2017 Negative  Negative mg/dL Final    Specific Gravity, UA 08/21/2017 1.018  1.005 - 1.035 Final    Erythrocytes, Urine 08/21/2017 Small* Negative Final    pH, UA 08/21/2017 6.0  5.0 - 8.0 Final    Protein, UA 08/21/2017 Negative  Negative mg/dL Final    Urobilinogen, Urine 08/21/2017 <2.0  <2.0 mg/dL Final    Nitrite, Urine 08/21/2017 Negative  Negative Final    Leukocyte Esterase, Urine 08/21/2017 Negative  Negative Final    RBC, UA 08/21/2017 1  0 - 3 /HPF Final    LEUKOCYTES, UA 08/21/2017 0  0 - 5 /HPF Final   Orders Only on 08/17/2017   Component Date Value Ref Range Status    aPTT 08/17/2017 33.4  23.1 - 37.6 seconds Final    Antibody Screen 08/17/2017 Negative   Final    ABO Grouping 08/17/2017 A   Final    Rh Factor 08/17/2017 Positive   Final    Cotinine, Urine 08/17/2017 339  0 - 500 ng/mL Final    Bacteria Identified 08/18/2017 *  Final                    Value:1,000-10,000 cfu/mL  Beta Hemolytic Streptococcus Group B  Beta hemolytic streptococci are predictably susceptible to penicillin and other beta-lactams. Susceptibility testing not routinely performed.       clinical report 08/19/2017 DELETED   Final   Orders Only on 08/17/2017   Component Date Value Ref Range Status    Protime 08/17/2017 12.0  9.4 - 12.6 seconds Final    INR 08/17/2017 1.1  0.9 - 1.1 Final   Orders Only on 08/17/2017   Component Date Value Ref Range Status    WBC 08/17/2017 5.8  3.8 - 10.8 10*3/uL Final    RBC Morphology 08/17/2017 4.64  4.20 - 5.80 10*6/uL Final    Hemoglobin 08/17/2017 14.5  13.2 - 17.1 g/dL Final    Hematocrit 08/17/2017 41.8  38.5 - 50.0 % Final    MCV 08/17/2017 90.0  80.0 - 100.0 fL Final    MCH 08/17/2017 31.3  27.0 - 33.0 pg Final    MCHC 08/17/2017 34.7  32.0 - 36.0 g/dL Final    RDW 08/17/2017 13.1  11.0 - 15.0 % Final    Platelets 19/17/5258 264  140 - 400 10*3/uL Final    MPV 08/17/2017 9.3  7.5 - 11.5 fL Final   Orders Only on 08/17/2017   Component Date Value Ref Range Status    Color, UA 08/17/2017 Yellow  Yellow,Straw Final    Clarity, UA 08/17/2017 Clear  Clear Final    Glucose, Ur 08/17/2017 Negative  Negative mg/dL Final    Bilirubin Urine 08/17/2017 Negative  Negative Final    Ketones, Urine 08/17/2017 Negative  Negative mg/dL Final    Specific Gravity, UA 08/17/2017 1.016  1.005 - 1.035 Final    Erythrocytes, Urine 08/17/2017 Negative  Negative Final    pH, UA 08/17/2017 6.0  5.0 - 8.0 Final    Protein, UA 08/17/2017 Negative  Negative mg/dL Final    Urobilinogen, Urine 08/17/2017 <2.0  <2.0 mg/dL Final    Nitrite, Urine 08/17/2017 Negative  Negative Final    Leukocyte Esterase, Urine 08/17/2017 Negative  Negative Final   Orders Only on 08/17/2017   Component Date Value Ref Range Status    Carboxyhemoglobin 08/17/2017 1.9  % Final         Assessment:  Diagnosis:   1. Bipolar disorder in full remission, most recent episode unspecified type ([facility] [address]    2. PTSD (post-traumatic stress disorder)    3. LINDA (generalized anxiety disorder)    4. Insomnia, unspecified type    PTSD >LINDA     Recommendations:  1. BAD/ Anxiety/ sleep : depression resolved. Continue lamictal  200mg daily. Monitor for rash. Reduce remeron to 7.5mg to aid in sleep only. Possible higher dose was worsening mood sxs. Will monitor. Can try OTC NAC for picking. Has been effective for trichotillomania. Reviewed R/B/SE  No safety concerns  Continue with  rajinder and Dr. Blanca Serrano when she returns.   F/U in 3 month

## 2018-02-21 ENCOUNTER — TELEPHONE (OUTPATIENT)
Dept: PSYCHIATRY | Age: 43
End: 2018-02-21

## 2018-02-21 RX ORDER — LORAZEPAM 0.5 MG/1
0.5 TABLET ORAL EVERY 8 HOURS PRN
Qty: 10 TABLET | Refills: 0 | Status: SHIPPED | OUTPATIENT
Start: 2018-02-21 | End: 2018-03-23

## 2018-02-27 ENCOUNTER — OFFICE VISIT (OUTPATIENT)
Dept: FAMILY MEDICINE CLINIC | Age: 43
End: 2018-02-27

## 2018-02-27 VITALS
SYSTOLIC BLOOD PRESSURE: 132 MMHG | OXYGEN SATURATION: 97 % | HEART RATE: 111 BPM | BODY MASS INDEX: 29.4 KG/M2 | WEIGHT: 216.8 LBS | DIASTOLIC BLOOD PRESSURE: 76 MMHG | TEMPERATURE: 98.2 F

## 2018-02-27 DIAGNOSIS — J40 BRONCHITIS: Primary | ICD-10-CM

## 2018-02-27 RX ORDER — AMOXICILLIN AND CLAVULANATE POTASSIUM 875; 125 MG/1; MG/1
1 TABLET, FILM COATED ORAL 2 TIMES DAILY
Qty: 20 TABLET | Refills: 0 | Status: SHIPPED | OUTPATIENT
Start: 2018-02-27 | End: 2018-03-09

## 2018-02-27 ASSESSMENT — ENCOUNTER SYMPTOMS
VOMITING: 0
NAUSEA: 0
COUGH: 1
ABDOMINAL PAIN: 0
GASTROINTESTINAL NEGATIVE: 1
DIARRHEA: 0
SWOLLEN GLANDS: 0
SINUS PAIN: 0
EYES NEGATIVE: 1
ALLERGIC/IMMUNOLOGIC NEGATIVE: 1
RHINORRHEA: 0
SORE THROAT: 0

## 2018-03-30 ENCOUNTER — OFFICE VISIT (OUTPATIENT)
Dept: FAMILY MEDICINE CLINIC | Age: 43
End: 2018-03-30

## 2018-03-30 VITALS
TEMPERATURE: 98.3 F | WEIGHT: 223.6 LBS | BODY MASS INDEX: 30.33 KG/M2 | SYSTOLIC BLOOD PRESSURE: 116 MMHG | DIASTOLIC BLOOD PRESSURE: 74 MMHG

## 2018-03-30 DIAGNOSIS — R07.89 OTHER CHEST PAIN: Primary | ICD-10-CM

## 2018-03-30 DIAGNOSIS — F32.1 MODERATE SINGLE CURRENT EPISODE OF MAJOR DEPRESSIVE DISORDER (HCC): ICD-10-CM

## 2018-03-30 DIAGNOSIS — E78.2 MIXED HYPERLIPIDEMIA: ICD-10-CM

## 2018-03-30 DIAGNOSIS — F51.04 PSYCHOPHYSIOLOGICAL INSOMNIA: ICD-10-CM

## 2018-03-30 DIAGNOSIS — F41.1 GENERALIZED ANXIETY DISORDER: ICD-10-CM

## 2018-03-30 DIAGNOSIS — F31.60 BIPOLAR AFFECTIVE DISORDER, MIXED (HCC): ICD-10-CM

## 2018-03-30 PROCEDURE — 99214 OFFICE O/P EST MOD 30 MIN: CPT | Performed by: FAMILY MEDICINE

## 2018-03-30 RX ORDER — TRAZODONE HYDROCHLORIDE 50 MG/1
50 TABLET ORAL NIGHTLY
Qty: 30 TABLET | Refills: 3 | Status: SHIPPED | OUTPATIENT
Start: 2018-03-30 | End: 2018-04-23 | Stop reason: SDUPTHER

## 2018-04-01 ASSESSMENT — ENCOUNTER SYMPTOMS
GASTROINTESTINAL NEGATIVE: 1
RESPIRATORY NEGATIVE: 1
EYES NEGATIVE: 1

## 2018-04-02 ENCOUNTER — INITIAL CONSULT (OUTPATIENT)
Dept: PSYCHIATRY | Age: 43
End: 2018-04-02

## 2018-04-02 VITALS
BODY MASS INDEX: 30.22 KG/M2 | HEIGHT: 73 IN | SYSTOLIC BLOOD PRESSURE: 118 MMHG | OXYGEN SATURATION: 97 % | WEIGHT: 228 LBS | HEART RATE: 98 BPM | DIASTOLIC BLOOD PRESSURE: 76 MMHG

## 2018-04-02 DIAGNOSIS — F39 MOOD DISORDER (HCC): ICD-10-CM

## 2018-04-02 DIAGNOSIS — F41.1 GAD (GENERALIZED ANXIETY DISORDER): Primary | ICD-10-CM

## 2018-04-02 PROCEDURE — 99214 OFFICE O/P EST MOD 30 MIN: CPT | Performed by: PSYCHIATRY & NEUROLOGY

## 2018-04-02 RX ORDER — HYDROXYZINE PAMOATE 25 MG/1
25 CAPSULE ORAL 3 TIMES DAILY PRN
Qty: 90 CAPSULE | Refills: 3 | Status: SHIPPED | OUTPATIENT
Start: 2018-04-02 | End: 2018-04-16

## 2018-04-02 ASSESSMENT — ENCOUNTER SYMPTOMS
EYES NEGATIVE: 1
GASTROINTESTINAL NEGATIVE: 1
RESPIRATORY NEGATIVE: 1

## 2018-04-03 DIAGNOSIS — E78.2 MIXED HYPERLIPIDEMIA: ICD-10-CM

## 2018-04-03 LAB
CHOLESTEROL, TOTAL: 305 MG/DL (ref 0–199)
HDLC SERPL-MCNC: 5 MG/DL (ref 40–60)
LDL CHOLESTEROL CALCULATED: 245 MG/DL
TRIGL SERPL-MCNC: 273 MG/DL (ref 0–150)
VLDLC SERPL CALC-MCNC: 55 MG/DL

## 2018-04-05 DIAGNOSIS — E78.2 MIXED HYPERLIPIDEMIA: ICD-10-CM

## 2018-04-05 RX ORDER — ATORVASTATIN CALCIUM 80 MG/1
40 TABLET, FILM COATED ORAL DAILY
Qty: 90 TABLET | Refills: 1
Start: 2018-04-05

## 2018-04-06 ENCOUNTER — OFFICE VISIT (OUTPATIENT)
Dept: FAMILY MEDICINE CLINIC | Age: 43
End: 2018-04-06

## 2018-04-06 VITALS
DIASTOLIC BLOOD PRESSURE: 86 MMHG | TEMPERATURE: 98.5 F | WEIGHT: 225.4 LBS | BODY MASS INDEX: 29.74 KG/M2 | HEART RATE: 70 BPM | SYSTOLIC BLOOD PRESSURE: 130 MMHG | OXYGEN SATURATION: 94 %

## 2018-04-06 DIAGNOSIS — B96.89 ACUTE BACTERIAL BRONCHITIS: Primary | ICD-10-CM

## 2018-04-06 DIAGNOSIS — J20.8 ACUTE BACTERIAL BRONCHITIS: Primary | ICD-10-CM

## 2018-04-06 PROCEDURE — 99213 OFFICE O/P EST LOW 20 MIN: CPT | Performed by: FAMILY MEDICINE

## 2018-04-06 RX ORDER — AMOXICILLIN AND CLAVULANATE POTASSIUM 875; 125 MG/1; MG/1
1 TABLET, FILM COATED ORAL 2 TIMES DAILY
Qty: 20 TABLET | Refills: 0 | Status: SHIPPED | OUTPATIENT
Start: 2018-04-06 | End: 2018-04-16

## 2018-04-08 ASSESSMENT — ENCOUNTER SYMPTOMS
EYES NEGATIVE: 1
RESPIRATORY NEGATIVE: 1
GASTROINTESTINAL NEGATIVE: 1

## 2018-04-19 ENCOUNTER — PATIENT MESSAGE (OUTPATIENT)
Dept: FAMILY MEDICINE CLINIC | Age: 43
End: 2018-04-19

## 2018-04-19 DIAGNOSIS — J32.9 RECURRENT SINUSITIS: Primary | ICD-10-CM

## 2018-04-23 ENCOUNTER — PATIENT MESSAGE (OUTPATIENT)
Dept: FAMILY MEDICINE CLINIC | Age: 43
End: 2018-04-23

## 2018-04-23 DIAGNOSIS — F41.9 ANXIETY: Primary | ICD-10-CM

## 2018-04-23 DIAGNOSIS — F51.04 PSYCHOPHYSIOLOGICAL INSOMNIA: ICD-10-CM

## 2018-04-23 RX ORDER — ALPRAZOLAM 0.5 MG/1
0.5 TABLET ORAL 3 TIMES DAILY PRN
Qty: 12 TABLET | Refills: 0 | Status: SHIPPED | OUTPATIENT
Start: 2018-04-23 | End: 2018-05-23

## 2018-04-23 RX ORDER — TRAZODONE HYDROCHLORIDE 50 MG/1
50 TABLET ORAL NIGHTLY
Qty: 90 TABLET | Refills: 1 | Status: SHIPPED | OUTPATIENT
Start: 2018-04-23

## 2018-05-14 ENCOUNTER — INITIAL CONSULT (OUTPATIENT)
Dept: PSYCHIATRY | Age: 43
End: 2018-05-14

## 2018-05-14 DIAGNOSIS — F41.1 GAD (GENERALIZED ANXIETY DISORDER): Primary | ICD-10-CM

## 2018-05-14 DIAGNOSIS — F39 MOOD DISORDER (HCC): ICD-10-CM

## 2018-05-14 PROCEDURE — 99213 OFFICE O/P EST LOW 20 MIN: CPT | Performed by: PSYCHIATRY & NEUROLOGY

## 2018-05-14 RX ORDER — LAMOTRIGINE 200 MG/1
200 TABLET ORAL DAILY
Qty: 30 TABLET | Refills: 5 | Status: SHIPPED | OUTPATIENT
Start: 2018-05-14

## 2018-05-14 ASSESSMENT — ENCOUNTER SYMPTOMS
GASTROINTESTINAL NEGATIVE: 1
RESPIRATORY NEGATIVE: 1
EYES NEGATIVE: 1

## 2018-05-17 ENCOUNTER — OFFICE VISIT (OUTPATIENT)
Dept: ENT CLINIC | Age: 43
End: 2018-05-17

## 2018-05-17 VITALS
HEART RATE: 76 BPM | DIASTOLIC BLOOD PRESSURE: 83 MMHG | WEIGHT: 216.8 LBS | HEIGHT: 72 IN | BODY MASS INDEX: 29.36 KG/M2 | SYSTOLIC BLOOD PRESSURE: 132 MMHG

## 2018-05-17 DIAGNOSIS — J30.89 CHRONIC NON-SEASONAL ALLERGIC RHINITIS, UNSPECIFIED TRIGGER: ICD-10-CM

## 2018-05-17 DIAGNOSIS — R04.0 EPISTAXIS: Primary | ICD-10-CM

## 2018-05-17 PROCEDURE — 96372 THER/PROPH/DIAG INJ SC/IM: CPT | Performed by: OTOLARYNGOLOGY

## 2018-05-17 PROCEDURE — 30901 CONTROL OF NOSEBLEED: CPT | Performed by: OTOLARYNGOLOGY

## 2018-05-17 PROCEDURE — 99243 OFF/OP CNSLTJ NEW/EST LOW 30: CPT | Performed by: OTOLARYNGOLOGY

## 2018-05-17 RX ORDER — BETAMETHASONE SODIUM PHOSPHATE AND BETAMETHASONE ACETATE 3; 3 MG/ML; MG/ML
6 INJECTION, SUSPENSION INTRA-ARTICULAR; INTRALESIONAL; INTRAMUSCULAR; SOFT TISSUE ONCE
Status: COMPLETED | OUTPATIENT
Start: 2018-05-17 | End: 2018-05-17

## 2018-05-17 RX ADMIN — BETAMETHASONE SODIUM PHOSPHATE AND BETAMETHASONE ACETATE 6 MG: 3; 3 INJECTION, SUSPENSION INTRA-ARTICULAR; INTRALESIONAL; INTRAMUSCULAR; SOFT TISSUE at 16:42

## 2018-05-22 ENCOUNTER — TELEPHONE (OUTPATIENT)
Dept: ENT CLINIC | Age: 43
End: 2018-05-22

## 2018-06-12 ENCOUNTER — CLINICAL DOCUMENTATION (OUTPATIENT)
Dept: PSYCHOLOGY | Age: 43
End: 2018-06-12

## 2018-07-01 DIAGNOSIS — E78.2 MIXED HYPERLIPIDEMIA: ICD-10-CM

## 2018-07-01 RX ORDER — EZETIMIBE 10 MG/1
10 TABLET ORAL DAILY
Qty: 90 TABLET | Refills: 1 | Status: SHIPPED | OUTPATIENT
Start: 2018-07-01 | End: 2019-07-01

## 2018-07-02 DIAGNOSIS — E78.2 MIXED HYPERLIPIDEMIA: ICD-10-CM

## 2018-07-02 RX ORDER — FENOFIBRATE 145 MG/1
145 TABLET, COATED ORAL DAILY
Qty: 90 TABLET | Refills: 0 | Status: SHIPPED | OUTPATIENT
Start: 2018-07-02

## 2018-07-02 NOTE — TELEPHONE ENCOUNTER
From: Yohana Chowdhury  Sent: 7/1/2018 7:19 AM EDT  Subject: Medication Renewal Request    Zarina Souza would like a refill of the following medications:     fenofibrate (TRICOR) 145 MG tablet Ailyn Myers MD]    Preferred pharmacy: [address], [place] [address] [phone] - F [phone]    Comment:

## 2018-07-20 ENCOUNTER — OFFICE VISIT (OUTPATIENT)
Dept: FAMILY MEDICINE CLINIC | Age: 43
End: 2018-07-20

## 2018-07-20 VITALS
BODY MASS INDEX: 29.5 KG/M2 | TEMPERATURE: 98.7 F | OXYGEN SATURATION: 97 % | HEIGHT: 72 IN | DIASTOLIC BLOOD PRESSURE: 78 MMHG | SYSTOLIC BLOOD PRESSURE: 136 MMHG | HEART RATE: 74 BPM | WEIGHT: 217.8 LBS

## 2018-07-20 DIAGNOSIS — Z01.818 PREOP EXAMINATION: Primary | ICD-10-CM

## 2018-07-20 DIAGNOSIS — M24.111 DEGENERATIVE TEAR OF GLENOID LABRUM OF RIGHT SHOULDER: ICD-10-CM

## 2018-07-20 PROCEDURE — 99212 OFFICE O/P EST SF 10 MIN: CPT | Performed by: NURSE PRACTITIONER

## 2018-07-20 NOTE — PROGRESS NOTES
Subjective:      Iker Vázquez is a 44 y.o.  male who presents to the office today for a preoperative consultation at the request of surgeon Haylie Woody who plans on performing Arthroscopic Left Shoulder Labrum Tear on August 1. Patient has failed to improve with conservative management such as physical therapy and OTC pain management. This consultation is requested for preoperative evaluation. Planned anesthesia is General.  The patient has the following known anesthesia issues: none  Patient has a bleeding risk of : no recent abnormal bleeding  Patient does not have objection to receiving blood products if needed. Patient's medications, allergies, past medical, surgical, social and family histories were reviewed and updated as appropriate.   Review of Systems  A comprehensive review of systems was negative except for: Musculoskeletal: positive for right shoulder pain       Objective:      Physical Exam  /78   Pulse 74   Temp 98.7 °F (37.1 °C) (Oral)   Ht 6' (1.829 m)   Wt 217 lb 12.8 oz (98.8 kg)   SpO2 97%   BMI 29.54 kg/m²     General Appearance:  Alert, cooperative, no distress, appears stated age   Head:  Normocephalic, without obvious abnormality, atraumatic   Eyes:  PERRL, conjunctiva/corneas clear, EOM's intact, fundi benign, both eyes   Ears:  Normal TM's and external ear canals, both ears   Nose: Nares normal, septum midline, mucosa normal, no drainage or sinus tenderness   Throat: Lips, mucosa, and tongue normal; teeth and gums normal   Neck: Supple, symmetrical, trachea midline, no adenopathy, thyroid: not enlarged, symmetric, no tenderness/mass/nodules, no carotid bruit or JVD   Back:   Symmetric, no curvature, ROM normal, no CVA tenderness   Lungs:   Clear to auscultation bilaterally, respirations unlabored   Chest Wall:  No tenderness or deformity   Heart:  Regular rate and rhythm, S1, S2 normal, no murmur, rub or gallop   Abdomen:   Soft, non-tender, bowel sounds active all

## 2019-01-30 ENCOUNTER — APPOINTMENT (OUTPATIENT)
Dept: GENERAL RADIOLOGY | Age: 44
End: 2019-01-30
Payer: COMMERCIAL

## 2019-01-30 ENCOUNTER — HOSPITAL ENCOUNTER (EMERGENCY)
Age: 44
Discharge: HOME | End: 2019-01-30
Attending: EMERGENCY MEDICINE
Payer: COMMERCIAL

## 2019-01-30 VITALS
HEART RATE: 81 BPM | TEMPERATURE: 98.2 F | DIASTOLIC BLOOD PRESSURE: 74 MMHG | RESPIRATION RATE: 17 BRPM | OXYGEN SATURATION: 96 % | SYSTOLIC BLOOD PRESSURE: 163 MMHG

## 2019-01-30 DIAGNOSIS — I10 HYPERTENSION, UNSPECIFIED TYPE: Primary | ICD-10-CM

## 2019-01-30 LAB
ANION GAP SERPL CALCULATED.3IONS-SCNC: 15 MMOL/L (ref 3–16)
BUN BLDV-MCNC: 16 MG/DL (ref 7–20)
CALCIUM SERPL-MCNC: 9.5 MG/DL (ref 8.3–10.6)
CHLORIDE BLD-SCNC: 101 MMOL/L (ref 99–110)
CO2: 22 MMOL/L (ref 21–32)
CREAT SERPL-MCNC: 0.8 MG/DL (ref 0.9–1.3)
EKG ATRIAL RATE: 76 BPM
EKG DIAGNOSIS: NORMAL
EKG P AXIS: 44 DEGREES
EKG P-R INTERVAL: 152 MS
EKG Q-T INTERVAL: 358 MS
EKG QRS DURATION: 116 MS
EKG QTC CALCULATION (BAZETT): 402 MS
EKG R AXIS: 51 DEGREES
EKG T AXIS: 33 DEGREES
EKG VENTRICULAR RATE: 76 BPM
GFR AFRICAN AMERICAN: >60
GFR NON-AFRICAN AMERICAN: >60
GLUCOSE BLD-MCNC: 87 MG/DL (ref 70–99)
POTASSIUM REFLEX MAGNESIUM: 4.1 MMOL/L (ref 3.5–5.1)
SODIUM BLD-SCNC: 138 MMOL/L (ref 136–145)

## 2019-01-30 PROCEDURE — 93005 ELECTROCARDIOGRAM TRACING: CPT | Performed by: PHYSICIAN ASSISTANT

## 2019-01-30 PROCEDURE — 80048 BASIC METABOLIC PNL TOTAL CA: CPT

## 2019-01-30 PROCEDURE — 71046 X-RAY EXAM CHEST 2 VIEWS: CPT

## 2019-01-30 PROCEDURE — 99284 EMERGENCY DEPT VISIT MOD MDM: CPT

## 2019-01-30 PROCEDURE — 6370000000 HC RX 637 (ALT 250 FOR IP): Performed by: PHYSICIAN ASSISTANT

## 2019-01-30 RX ORDER — AMLODIPINE BESYLATE 5 MG/1
5 TABLET ORAL DAILY
Qty: 30 TABLET | Refills: 0 | Status: SHIPPED | OUTPATIENT
Start: 2019-01-30 | End: 2019-03-01

## 2019-01-30 RX ORDER — AMLODIPINE BESYLATE 5 MG/1
5 TABLET ORAL DAILY
Status: DISCONTINUED | OUTPATIENT
Start: 2019-01-30 | End: 2019-01-30 | Stop reason: HOSPADM

## 2019-01-30 RX ADMIN — AMLODIPINE BESYLATE 5 MG: 5 TABLET ORAL at 18:06

## 2019-01-30 ASSESSMENT — ENCOUNTER SYMPTOMS
ABDOMINAL PAIN: 0
SHORTNESS OF BREATH: 0
NAUSEA: 0
VOMITING: 0
DIARRHEA: 0
COUGH: 1

## 2019-06-29 ENCOUNTER — HOSPITAL ENCOUNTER (EMERGENCY)
Age: 44
Discharge: HOME | End: 2019-06-29
Attending: EMERGENCY MEDICINE
Payer: COMMERCIAL

## 2019-06-29 VITALS
DIASTOLIC BLOOD PRESSURE: 87 MMHG | WEIGHT: 220 LBS | HEIGHT: 73 IN | OXYGEN SATURATION: 99 % | BODY MASS INDEX: 29.16 KG/M2 | RESPIRATION RATE: 20 BRPM | TEMPERATURE: 98.5 F | SYSTOLIC BLOOD PRESSURE: 148 MMHG | HEART RATE: 72 BPM

## 2019-06-29 DIAGNOSIS — S39.012A BACK STRAIN, INITIAL ENCOUNTER: Primary | ICD-10-CM

## 2019-06-29 PROCEDURE — 99283 EMERGENCY DEPT VISIT LOW MDM: CPT

## 2019-06-29 RX ORDER — METHYLPREDNISOLONE 4 MG/1
TABLET ORAL
Qty: 1 KIT | Refills: 0 | Status: SHIPPED | OUTPATIENT
Start: 2019-06-29 | End: 2019-07-05

## 2019-06-29 ASSESSMENT — PAIN SCALES - GENERAL: PAINLEVEL_OUTOF10: 8

## 2019-06-29 ASSESSMENT — PAIN DESCRIPTION - LOCATION: LOCATION: LEG;BACK

## 2019-06-29 ASSESSMENT — PAIN DESCRIPTION - PAIN TYPE: TYPE: ACUTE PAIN

## 2019-06-29 ASSESSMENT — PAIN DESCRIPTION - ORIENTATION: ORIENTATION: RIGHT;LEFT

## 2019-06-29 NOTE — ED PROVIDER NOTES
ENT:  No discharge from nose. OP clear. Neck:  Supple. Nontender. Pulmonary:   Non-labored breathing. Breath sounds clear bilaterally. Cardiac:  Regular rate and rhythm. No murmurs. Abdomen:  Soft. Non-tender. Non-distended. No masses. Musculoskeletal:  No long bone deformity. No ankle or wrist deformity. +paraspinal TTP on right, no midline spine tenderness. +spasm on right lumbar region. Vascular:  Extremities warm and perfused. PT pulses 2+ bilaterally. Skin:  No rash. Warm. Neuro: Alert and oriented x 3. CN II-XII grossly intact. Speech and mentation normal.    5/5 strength in all 4 extremities by finger  and ankle dorsi/plantar flexion. Full strength also in hip flexion/extension. Sensation grossly intact to light touch, including dorsal (including first intertriginous space) and plantar surface foot  Gait narrow and stable, not ataxic. Extremities:  No peripheral edema. LE symmetric. DiagnosticResults     EKG   None indicated/not performed    RADIOLOGY:  No orders to display       LABS:   No results found for this visit on 06/29/19. ED BEDSIDE ULTRASOUND:  None indicated/not performed    RECENT VITALS:  BP: (!) 148/87, Temp: 98.5 °F (36.9 °C), Pulse: 72,Resp: 20, SpO2: 99 %     Procedures   None indicated/not performed    ED Course     Nursing Notes, Past Medical Hx, Past Surgical Hx, Social Hx, Allergies, and Family Hx were reviewed. The patient was given the followingmedications:  Orders Placed This Encounter   Medications    methylPREDNISolone (MEDROL, RACHANA,) 4 MG tablet     Sig: Take by mouth. Dispense:  1 kit     Refill:  0       CONSULTS:  None    MEDICAL DECISION MAKING / ASSESSMENT / Debora Saul is a 38 y.o. male presenting with back pain. This patient was also evaluated by the attending physician. All care plans werediscussed and agreed upon.     Based on our evaluation the patient's presentation is most consistent with musculoskeletal back pain.  There is no signs of cauda equina syndrome or cord compression on our evaluation. We have a very low suspicion of epidural abscess/hematoma or other signs of vascular emergency contributing to the patient's pain given the patient's history. There are no signs on exam of acute neurologic compromise. There is no history of recent trauma that would make us concerned for acute fracture. We have counseled the patient on appropriate stretching and strengthening exercises to help control symptoms and encouraged follow-up as an outpatient after strict return precautions were provided. Lidocaine OTC patches encouraged. Medrol dose rachana prescribed. OTC pain medication information provided. Risks, benefits, and alternatives to the proposed outpatient treatment plan were discussed. The patient has been hemodynamically stable and afebrile in the department. At this time the patient has been deemed safe for discharge. Workup, treatment and diagnosis were discussed with the patient and/or family members; the patient agrees to the plan and all questions were addressed. Discharge instructions including strict return precautions for worsening or new symptoms have been communicated both verbally and in written format. The patient verbalized understanding of these instructions. Clinical Impression     1. Back strain, initial encounter        Disposition     PATIENT REFERRED TO:  Jarek Farrell    Schedule an appointment as soon as possible for a visit in 1 week  Discuss your ED visit, and referrals/medication      DISCHARGE MEDICATIONS:  New Prescriptions    METHYLPREDNISOLONE (MEDROL, RACHANA,) 4 MG TABLET    Take by mouth.        DISPOSITION Decision To Discharge 06/29/2019 04:00:51 PM     Sharon Ramírez MD  Resident  06/29/19 [id number]